# Patient Record
Sex: FEMALE | Race: WHITE | NOT HISPANIC OR LATINO | Employment: FULL TIME | URBAN - METROPOLITAN AREA
[De-identification: names, ages, dates, MRNs, and addresses within clinical notes are randomized per-mention and may not be internally consistent; named-entity substitution may affect disease eponyms.]

---

## 2023-04-11 DIAGNOSIS — F10.20 ALCOHOL USE DISORDER, SEVERE, DEPENDENCE (MULTI): Primary | ICD-10-CM

## 2023-04-13 RX ORDER — NALTREXONE HYDROCHLORIDE 50 MG/1
50 TABLET, FILM COATED ORAL DAILY
COMMUNITY
Start: 2023-01-12 | End: 2023-04-13 | Stop reason: SDUPTHER

## 2023-04-14 DIAGNOSIS — R21 RASH: ICD-10-CM

## 2023-04-14 DIAGNOSIS — Z00.00 ROUTINE GENERAL MEDICAL EXAMINATION AT A HEALTH CARE FACILITY: Primary | ICD-10-CM

## 2023-04-14 RX ORDER — NALTREXONE HYDROCHLORIDE 50 MG/1
50 TABLET, FILM COATED ORAL DAILY
Qty: 30 TABLET | Refills: 0 | Status: SHIPPED | OUTPATIENT
Start: 2023-04-14 | End: 2023-05-23 | Stop reason: SDUPTHER

## 2023-04-14 RX ORDER — MUPIROCIN 20 MG/G
OINTMENT TOPICAL 3 TIMES DAILY
Qty: 15 G | Refills: 0 | Status: SHIPPED | OUTPATIENT
Start: 2023-04-14 | End: 2023-04-24

## 2023-04-14 NOTE — TELEPHONE ENCOUNTER
Patient notified, states she was taking the medicine previous to seeing you but starting taking it again in the fall. She will schedule an appt to discuss this as well. Thanks

## 2023-04-25 ENCOUNTER — APPOINTMENT (OUTPATIENT)
Dept: PRIMARY CARE | Facility: CLINIC | Age: 43
End: 2023-04-25
Payer: COMMERCIAL

## 2023-05-23 DIAGNOSIS — F10.20 ALCOHOL USE DISORDER, SEVERE, DEPENDENCE (MULTI): Primary | ICD-10-CM

## 2023-05-24 RX ORDER — NALTREXONE HYDROCHLORIDE 50 MG/1
50 TABLET, FILM COATED ORAL DAILY
Qty: 30 TABLET | Refills: 0 | Status: SHIPPED | OUTPATIENT
Start: 2023-05-24 | End: 2023-09-13

## 2023-05-25 ENCOUNTER — APPOINTMENT (OUTPATIENT)
Dept: PRIMARY CARE | Facility: CLINIC | Age: 43
End: 2023-05-25
Payer: COMMERCIAL

## 2023-06-12 ASSESSMENT — PROMIS GLOBAL HEALTH SCALE
RATE_PHYSICAL_HEALTH: GOOD
EMOTIONAL_PROBLEMS: SOMETIMES
CARRYOUT_SOCIAL_ACTIVITIES: VERY GOOD
RATE_AVERAGE_FATIGUE: MILD
RATE_MENTAL_HEALTH: GOOD
CARRYOUT_PHYSICAL_ACTIVITIES: COMPLETELY
RATE_SOCIAL_SATISFACTION: FAIR
RATE_GENERAL_HEALTH: GOOD
RATE_QUALITY_OF_LIFE: GOOD
RATE_AVERAGE_PAIN: 1

## 2023-06-16 ENCOUNTER — APPOINTMENT (OUTPATIENT)
Dept: PRIMARY CARE | Facility: CLINIC | Age: 43
End: 2023-06-16
Payer: COMMERCIAL

## 2023-06-20 ENCOUNTER — APPOINTMENT (OUTPATIENT)
Dept: PRIMARY CARE | Facility: CLINIC | Age: 43
End: 2023-06-20
Payer: COMMERCIAL

## 2023-07-21 DIAGNOSIS — K21.9 GASTROESOPHAGEAL REFLUX DISEASE WITHOUT ESOPHAGITIS: Primary | ICD-10-CM

## 2023-07-21 RX ORDER — LANSOPRAZOLE 30 MG/1
30 CAPSULE, DELAYED RELEASE ORAL
COMMUNITY
End: 2023-07-21 | Stop reason: SDUPTHER

## 2023-07-23 RX ORDER — LANSOPRAZOLE 30 MG/1
30 CAPSULE, DELAYED RELEASE ORAL
Qty: 90 CAPSULE | Refills: 0 | Status: SHIPPED | OUTPATIENT
Start: 2023-07-23 | End: 2023-10-12 | Stop reason: SDUPTHER

## 2023-08-24 ENCOUNTER — APPOINTMENT (OUTPATIENT)
Dept: PRIMARY CARE | Facility: CLINIC | Age: 43
End: 2023-08-24
Payer: COMMERCIAL

## 2023-09-09 ASSESSMENT — PROMIS GLOBAL HEALTH SCALE
RATE_MENTAL_HEALTH: VERY GOOD
RATE_SOCIAL_SATISFACTION: VERY GOOD
CARRYOUT_PHYSICAL_ACTIVITIES: COMPLETELY
RATE_AVERAGE_PAIN: 1
RATE_QUALITY_OF_LIFE: VERY GOOD
CARRYOUT_SOCIAL_ACTIVITIES: VERY GOOD
RATE_GENERAL_HEALTH: VERY GOOD
RATE_AVERAGE_FATIGUE: MILD
RATE_PHYSICAL_HEALTH: VERY GOOD
EMOTIONAL_PROBLEMS: RARELY

## 2023-09-13 ENCOUNTER — OFFICE VISIT (OUTPATIENT)
Dept: PRIMARY CARE | Facility: CLINIC | Age: 43
End: 2023-09-13
Payer: COMMERCIAL

## 2023-09-13 VITALS
WEIGHT: 131.2 LBS | SYSTOLIC BLOOD PRESSURE: 120 MMHG | HEIGHT: 66 IN | BODY MASS INDEX: 21.08 KG/M2 | DIASTOLIC BLOOD PRESSURE: 85 MMHG | HEART RATE: 83 BPM

## 2023-09-13 DIAGNOSIS — G47.9 SLEEP DISORDER: ICD-10-CM

## 2023-09-13 DIAGNOSIS — Z00.00 ROUTINE GENERAL MEDICAL EXAMINATION AT A HEALTH CARE FACILITY: Primary | ICD-10-CM

## 2023-09-13 DIAGNOSIS — Z23 NEED FOR INFLUENZA VACCINATION: ICD-10-CM

## 2023-09-13 DIAGNOSIS — F41.8 DEPRESSION WITH ANXIETY: ICD-10-CM

## 2023-09-13 DIAGNOSIS — K21.9 GASTROESOPHAGEAL REFLUX DISEASE, UNSPECIFIED WHETHER ESOPHAGITIS PRESENT: ICD-10-CM

## 2023-09-13 DIAGNOSIS — E78.5 HYPERLIPIDEMIA, UNSPECIFIED HYPERLIPIDEMIA TYPE: ICD-10-CM

## 2023-09-13 PROBLEM — L73.2 HIDRADENITIS SUPPURATIVA: Status: RESOLVED | Noted: 2023-08-10 | Resolved: 2023-09-13

## 2023-09-13 PROBLEM — I10 WHITE COAT SYNDROME WITH HYPERTENSION: Status: RESOLVED | Noted: 2023-09-13 | Resolved: 2023-09-13

## 2023-09-13 PROBLEM — F41.9 ANXIETY: Status: ACTIVE | Noted: 2023-09-13

## 2023-09-13 PROBLEM — F10.20 ALCOHOL USE DISORDER, SEVERE, DEPENDENCE (MULTI): Status: RESOLVED | Noted: 2023-09-13 | Resolved: 2023-09-13

## 2023-09-13 PROBLEM — L72.3 SEBACEOUS CYST: Status: RESOLVED | Noted: 2023-08-10 | Resolved: 2023-09-13

## 2023-09-13 PROBLEM — N60.99 ATYPICAL DUCTAL HYPERPLASIA OF BREAST: Status: ACTIVE | Noted: 2023-09-13

## 2023-09-13 PROBLEM — N97.8 AGE RELATED FEMALE INFERTILITY: Status: ACTIVE | Noted: 2023-09-13

## 2023-09-13 PROBLEM — O00.109 TUBAL ECTOPIC PREGNANCY (HHS-HCC): Status: RESOLVED | Noted: 2023-09-13 | Resolved: 2023-09-13

## 2023-09-13 PROBLEM — J30.9 ALLERGIC RHINITIS: Status: ACTIVE | Noted: 2023-09-13

## 2023-09-13 PROBLEM — M26.69 TMJ INFLAMMATION: Status: RESOLVED | Noted: 2023-09-13 | Resolved: 2023-09-13

## 2023-09-13 LAB
BASOPHILS (10*3/UL) IN BLOOD BY AUTOMATED COUNT: 0.05 X10E9/L (ref 0–0.1)
BASOPHILS/100 LEUKOCYTES IN BLOOD BY AUTOMATED COUNT: 0.5 % (ref 0–2)
CALCIDIOL (25 OH VITAMIN D3) (NG/ML) IN SER/PLAS: 62 NG/ML
COBALAMIN (VITAMIN B12) (PG/ML) IN SER/PLAS: 409 PG/ML (ref 211–911)
EOSINOPHILS (10*3/UL) IN BLOOD BY AUTOMATED COUNT: 0.23 X10E9/L (ref 0–0.7)
EOSINOPHILS/100 LEUKOCYTES IN BLOOD BY AUTOMATED COUNT: 2.5 % (ref 0–6)
ERYTHROCYTE DISTRIBUTION WIDTH (RATIO) BY AUTOMATED COUNT: 12.9 % (ref 11.5–14.5)
ERYTHROCYTE MEAN CORPUSCULAR HEMOGLOBIN CONCENTRATION (G/DL) BY AUTOMATED: 32.3 G/DL (ref 32–36)
ERYTHROCYTE MEAN CORPUSCULAR VOLUME (FL) BY AUTOMATED COUNT: 98 FL (ref 80–100)
ERYTHROCYTES (10*6/UL) IN BLOOD BY AUTOMATED COUNT: 4.51 X10E12/L (ref 4–5.2)
ESTIMATED AVERAGE GLUCOSE FOR HBA1C: 100 MG/DL
FOLATE (NG/ML) IN SER/PLAS: >24 NG/ML
HEMATOCRIT (%) IN BLOOD BY AUTOMATED COUNT: 44.3 % (ref 36–46)
HEMOGLOBIN (G/DL) IN BLOOD: 14.3 G/DL (ref 12–16)
HEMOGLOBIN A1C/HEMOGLOBIN TOTAL IN BLOOD: 5.1 %
IMMATURE GRANULOCYTES/100 LEUKOCYTES IN BLOOD BY AUTOMATED COUNT: 0.2 % (ref 0–0.9)
LEUKOCYTES (10*3/UL) IN BLOOD BY AUTOMATED COUNT: 9.1 X10E9/L (ref 4.4–11.3)
LYMPHOCYTES (10*3/UL) IN BLOOD BY AUTOMATED COUNT: 2.13 X10E9/L (ref 1.2–4.8)
LYMPHOCYTES/100 LEUKOCYTES IN BLOOD BY AUTOMATED COUNT: 23.4 % (ref 13–44)
MONOCYTES (10*3/UL) IN BLOOD BY AUTOMATED COUNT: 0.51 X10E9/L (ref 0.1–1)
MONOCYTES/100 LEUKOCYTES IN BLOOD BY AUTOMATED COUNT: 5.6 % (ref 2–10)
NEUTROPHILS (10*3/UL) IN BLOOD BY AUTOMATED COUNT: 6.16 X10E9/L (ref 1.2–7.7)
NEUTROPHILS/100 LEUKOCYTES IN BLOOD BY AUTOMATED COUNT: 67.8 % (ref 40–80)
NRBC (PER 100 WBCS) BY AUTOMATED COUNT: 0 /100 WBC (ref 0–0)
PLATELETS (10*3/UL) IN BLOOD AUTOMATED COUNT: 219 X10E9/L (ref 150–450)
THYROTROPIN (MIU/L) IN SER/PLAS BY DETECTION LIMIT <= 0.05 MIU/L: 2.44 MIU/L (ref 0.44–3.98)

## 2023-09-13 PROCEDURE — 85025 COMPLETE CBC W/AUTO DIFF WBC: CPT

## 2023-09-13 PROCEDURE — 80061 LIPID PANEL: CPT

## 2023-09-13 PROCEDURE — 80053 COMPREHEN METABOLIC PANEL: CPT

## 2023-09-13 PROCEDURE — 82746 ASSAY OF FOLIC ACID SERUM: CPT

## 2023-09-13 PROCEDURE — 99396 PREV VISIT EST AGE 40-64: CPT | Performed by: INTERNAL MEDICINE

## 2023-09-13 PROCEDURE — 93000 ELECTROCARDIOGRAM COMPLETE: CPT | Performed by: INTERNAL MEDICINE

## 2023-09-13 PROCEDURE — 83550 IRON BINDING TEST: CPT

## 2023-09-13 PROCEDURE — 83540 ASSAY OF IRON: CPT

## 2023-09-13 PROCEDURE — 90471 IMMUNIZATION ADMIN: CPT | Performed by: INTERNAL MEDICINE

## 2023-09-13 PROCEDURE — 84443 ASSAY THYROID STIM HORMONE: CPT

## 2023-09-13 PROCEDURE — 82607 VITAMIN B-12: CPT

## 2023-09-13 PROCEDURE — 82306 VITAMIN D 25 HYDROXY: CPT

## 2023-09-13 PROCEDURE — 83036 HEMOGLOBIN GLYCOSYLATED A1C: CPT

## 2023-09-13 PROCEDURE — 90686 IIV4 VACC NO PRSV 0.5 ML IM: CPT | Performed by: INTERNAL MEDICINE

## 2023-09-13 RX ORDER — MONTELUKAST SODIUM 10 MG/1
1 TABLET ORAL NIGHTLY
COMMUNITY
Start: 2023-06-29

## 2023-09-13 RX ORDER — EPINEPHRINE 0.3 MG/.3ML
0.3 INJECTION SUBCUTANEOUS
COMMUNITY
Start: 2014-04-11

## 2023-09-13 RX ORDER — CLONIDINE HYDROCHLORIDE 0.1 MG/1
TABLET ORAL 2 TIMES DAILY
COMMUNITY
Start: 2021-01-27 | End: 2023-10-15 | Stop reason: SDUPTHER

## 2023-09-13 RX ORDER — ALBUTEROL SULFATE 90 UG/1
2 AEROSOL, METERED RESPIRATORY (INHALATION) EVERY 4 HOURS PRN
COMMUNITY
Start: 2017-12-20

## 2023-09-13 RX ORDER — AZELASTINE 1 MG/ML
1 SPRAY, METERED NASAL 2 TIMES DAILY
COMMUNITY
Start: 2022-04-25

## 2023-09-13 RX ORDER — LEVOCETIRIZINE DIHYDROCHLORIDE 5 MG/1
5 TABLET, FILM COATED ORAL
COMMUNITY
Start: 2022-05-18

## 2023-09-13 RX ORDER — FLUNISOLIDE 0.25 MG/ML
SOLUTION NASAL
COMMUNITY
Start: 2022-05-17

## 2023-09-13 ASSESSMENT — ENCOUNTER SYMPTOMS
FEVER: 0
DYSPHORIC MOOD: 0
COLOR CHANGE: 0
DECREASED CONCENTRATION: 0
ACTIVITY CHANGE: 0
SLEEP DISTURBANCE: 0
SINUS PAIN: 0
NAUSEA: 0
VOMITING: 0
WHEEZING: 0
HYPERACTIVE: 0
ABDOMINAL DISTENTION: 0
BRUISES/BLEEDS EASILY: 0
NECK STIFFNESS: 0
EYE DISCHARGE: 0
PALPITATIONS: 0
FACIAL ASYMMETRY: 0
HEMATURIA: 0
NECK PAIN: 0
NUMBNESS: 0
NERVOUS/ANXIOUS: 0
CHEST TIGHTNESS: 0
JOINT SWELLING: 0
MYALGIAS: 0
ADENOPATHY: 0
SHORTNESS OF BREATH: 0
COUGH: 0
FREQUENCY: 0
UNEXPECTED WEIGHT CHANGE: 0
RHINORRHEA: 0
LIGHT-HEADEDNESS: 0
WEAKNESS: 0
CONSTIPATION: 0
ARTHRALGIAS: 0
FATIGUE: 0
SORE THROAT: 0
DYSURIA: 0
SINUS PRESSURE: 0
DIARRHEA: 0
EYE ITCHING: 0
ABDOMINAL PAIN: 0
VOICE CHANGE: 0
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
## 2023-09-13 NOTE — PATIENT INSTRUCTIONS
We will follow up on all comprehensive blood work once results are available and make any recommendations based on these results as may be indicated.  Please continue with routine gynecologic exams and annual mammograms.  Colon cancer screening will start next year at age 45.  Thank you for receiving your flu shot.  If you have any questions or concerns, please feel free to contact us.  Otherwise, we are happy to see you back annually for your wellness visits.

## 2023-09-14 LAB
ALANINE AMINOTRANSFERASE (SGPT) (U/L) IN SER/PLAS: 15 U/L (ref 7–45)
ALBUMIN (G/DL) IN SER/PLAS: 4.5 G/DL (ref 3.4–5)
ALKALINE PHOSPHATASE (U/L) IN SER/PLAS: 42 U/L (ref 33–110)
ANION GAP IN SER/PLAS: 14 MMOL/L (ref 10–20)
ASPARTATE AMINOTRANSFERASE (SGOT) (U/L) IN SER/PLAS: 17 U/L (ref 9–39)
BILIRUBIN TOTAL (MG/DL) IN SER/PLAS: 0.8 MG/DL (ref 0–1.2)
CALCIUM (MG/DL) IN SER/PLAS: 10.5 MG/DL (ref 8.6–10.6)
CARBON DIOXIDE, TOTAL (MMOL/L) IN SER/PLAS: 27 MMOL/L (ref 21–32)
CHLORIDE (MMOL/L) IN SER/PLAS: 100 MMOL/L (ref 98–107)
CHOLESTEROL (MG/DL) IN SER/PLAS: 215 MG/DL (ref 0–199)
CHOLESTEROL IN HDL (MG/DL) IN SER/PLAS: 68.9 MG/DL
CHOLESTEROL/HDL RATIO: 3.1
CREATININE (MG/DL) IN SER/PLAS: 0.62 MG/DL (ref 0.5–1.05)
GFR FEMALE: >90 ML/MIN/1.73M2
GLUCOSE (MG/DL) IN SER/PLAS: 79 MG/DL (ref 74–99)
IRON (UG/DL) IN SER/PLAS: 175 UG/DL (ref 35–150)
IRON BINDING CAPACITY (UG/DL) IN SER/PLAS: 459 UG/DL (ref 240–445)
IRON SATURATION (%) IN SER/PLAS: 38 % (ref 25–45)
LDL: 106 MG/DL (ref 0–99)
NON HDL CHOLESTEROL: 146 MG/DL
POTASSIUM (MMOL/L) IN SER/PLAS: 4.5 MMOL/L (ref 3.5–5.3)
PROTEIN TOTAL: 7.1 G/DL (ref 6.4–8.2)
SODIUM (MMOL/L) IN SER/PLAS: 136 MMOL/L (ref 136–145)
TRIGLYCERIDE (MG/DL) IN SER/PLAS: 203 MG/DL (ref 0–149)
UREA NITROGEN (MG/DL) IN SER/PLAS: 7 MG/DL (ref 6–23)
VLDL: 41 MG/DL (ref 0–40)

## 2023-09-19 ENCOUNTER — TELEPHONE (OUTPATIENT)
Dept: PRIMARY CARE | Facility: CLINIC | Age: 43
End: 2023-09-19

## 2023-09-19 LAB
CHLAMYDIA TRACH., AMPLIFIED: NEGATIVE
N. GONORRHEA, AMPLIFIED: NEGATIVE
TRICHOMONAS VAGINALIS: NEGATIVE

## 2023-10-12 DIAGNOSIS — K21.9 GASTROESOPHAGEAL REFLUX DISEASE WITHOUT ESOPHAGITIS: Primary | ICD-10-CM

## 2023-10-13 RX ORDER — LANSOPRAZOLE 30 MG/1
30 CAPSULE, DELAYED RELEASE ORAL
Qty: 90 CAPSULE | Refills: 1 | Status: SHIPPED | OUTPATIENT
Start: 2023-10-13 | End: 2024-05-19 | Stop reason: SDUPTHER

## 2023-10-15 DIAGNOSIS — F41.9 ANXIETY: Primary | ICD-10-CM

## 2023-10-15 RX ORDER — CLONIDINE HYDROCHLORIDE 0.1 MG/1
0.1 TABLET ORAL 2 TIMES DAILY
Qty: 180 TABLET | Refills: 1 | Status: SHIPPED | OUTPATIENT
Start: 2023-10-15 | End: 2024-04-13 | Stop reason: SDUPTHER

## 2023-11-08 ENCOUNTER — TELEPHONE (OUTPATIENT)
Dept: OBSTETRICS AND GYNECOLOGY | Facility: CLINIC | Age: 43
End: 2023-11-08

## 2023-11-08 NOTE — TELEPHONE ENCOUNTER
Patient calling in regard to insurance, one of her test wasn't covered and would like for it to be re billed.

## 2023-11-20 DIAGNOSIS — R06.83 SNORING: ICD-10-CM

## 2023-11-20 DIAGNOSIS — J30.9 ALLERGIC RHINITIS, UNSPECIFIED SEASONALITY, UNSPECIFIED TRIGGER: Primary | ICD-10-CM

## 2023-12-13 ENCOUNTER — APPOINTMENT (OUTPATIENT)
Dept: OTOLARYNGOLOGY | Facility: CLINIC | Age: 43
End: 2023-12-13
Payer: COMMERCIAL

## 2024-01-19 ENCOUNTER — PATIENT MESSAGE (OUTPATIENT)
Dept: DERMATOLOGY | Facility: CLINIC | Age: 44
End: 2024-01-19
Payer: COMMERCIAL

## 2024-01-19 DIAGNOSIS — L30.9 HAND DERMATITIS: Primary | ICD-10-CM

## 2024-01-22 RX ORDER — TRIAMCINOLONE ACETONIDE 1 MG/G
CREAM TOPICAL 2 TIMES DAILY
Qty: 80 G | Refills: 3 | Status: SHIPPED | OUTPATIENT
Start: 2024-01-22 | End: 2024-01-31 | Stop reason: SDUPTHER

## 2024-01-22 RX ORDER — TRIAMCINOLONE ACETONIDE 1 MG/G
CREAM TOPICAL 2 TIMES DAILY
Qty: 80 G | Refills: 3 | Status: SHIPPED | OUTPATIENT
Start: 2024-01-22 | End: 2024-01-22 | Stop reason: SDUPTHER

## 2024-01-31 RX ORDER — TRIAMCINOLONE ACETONIDE 1 MG/G
CREAM TOPICAL 2 TIMES DAILY
Qty: 90 G | Refills: 3 | Status: SHIPPED | OUTPATIENT
Start: 2024-01-31

## 2024-02-14 ENCOUNTER — HOSPITAL ENCOUNTER (OUTPATIENT)
Dept: RADIOLOGY | Facility: EXTERNAL LOCATION | Age: 44
Discharge: HOME | End: 2024-02-14

## 2024-02-14 DIAGNOSIS — M25.562 ACUTE BILATERAL KNEE PAIN: ICD-10-CM

## 2024-02-14 DIAGNOSIS — M25.561 ACUTE BILATERAL KNEE PAIN: ICD-10-CM

## 2024-04-13 DIAGNOSIS — F41.9 ANXIETY: ICD-10-CM

## 2024-04-13 RX ORDER — CLONIDINE HYDROCHLORIDE 0.1 MG/1
0.1 TABLET ORAL 2 TIMES DAILY
Qty: 180 TABLET | Refills: 1 | Status: SHIPPED | OUTPATIENT
Start: 2024-04-13

## 2024-05-15 ENCOUNTER — LAB REQUISITION (OUTPATIENT)
Dept: LAB | Facility: HOSPITAL | Age: 44
End: 2024-05-15
Payer: COMMERCIAL

## 2024-05-15 DIAGNOSIS — R35.0 FREQUENCY OF MICTURITION: ICD-10-CM

## 2024-05-15 PROCEDURE — 87086 URINE CULTURE/COLONY COUNT: CPT

## 2024-05-17 LAB — BACTERIA UR CULT: ABNORMAL

## 2024-05-19 DIAGNOSIS — K21.9 GASTROESOPHAGEAL REFLUX DISEASE WITHOUT ESOPHAGITIS: ICD-10-CM

## 2024-05-19 RX ORDER — LANSOPRAZOLE 30 MG/1
30 CAPSULE, DELAYED RELEASE ORAL
Qty: 90 CAPSULE | Refills: 1 | Status: SHIPPED | OUTPATIENT
Start: 2024-05-19

## 2024-05-21 ENCOUNTER — PATIENT MESSAGE (OUTPATIENT)
Dept: DERMATOLOGY | Facility: CLINIC | Age: 44
End: 2024-05-21
Payer: COMMERCIAL

## 2024-06-21 ENCOUNTER — APPOINTMENT (OUTPATIENT)
Dept: RADIOLOGY | Facility: HOSPITAL | Age: 44
End: 2024-06-21
Payer: COMMERCIAL

## 2024-06-21 ENCOUNTER — HOSPITAL ENCOUNTER (OUTPATIENT)
Dept: RADIOLOGY | Facility: HOSPITAL | Age: 44
Discharge: HOME | End: 2024-06-21
Payer: COMMERCIAL

## 2024-06-21 ENCOUNTER — PATIENT MESSAGE (OUTPATIENT)
Dept: SURGICAL ONCOLOGY | Facility: CLINIC | Age: 44
End: 2024-06-21
Payer: COMMERCIAL

## 2024-06-21 DIAGNOSIS — Z12.39 ENCOUNTER FOR OTHER SCREENING FOR MALIGNANT NEOPLASM OF BREAST: ICD-10-CM

## 2024-06-21 NOTE — TELEPHONE ENCOUNTER
From: Ofelia Wiggins  To: Ofelia Chavarria  Sent: 6/21/2024 2:19 PM EDT  Subject: MRI    Hi  Where are the locations to schedule the breast MRI?  Thanks

## 2024-06-25 ENCOUNTER — APPOINTMENT (OUTPATIENT)
Dept: ORTHOPEDIC SURGERY | Facility: CLINIC | Age: 44
End: 2024-06-25
Payer: COMMERCIAL

## 2024-06-25 ENCOUNTER — HOSPITAL ENCOUNTER (OUTPATIENT)
Dept: RADIOLOGY | Facility: CLINIC | Age: 44
Discharge: HOME | End: 2024-06-25
Payer: COMMERCIAL

## 2024-06-25 DIAGNOSIS — M22.2X1 PATELLOFEMORAL ARTHRALGIA OF BOTH KNEES: Primary | ICD-10-CM

## 2024-06-25 DIAGNOSIS — M25.562 ACUTE BILATERAL KNEE PAIN: ICD-10-CM

## 2024-06-25 DIAGNOSIS — M25.561 ACUTE BILATERAL KNEE PAIN: ICD-10-CM

## 2024-06-25 DIAGNOSIS — M22.2X2 PATELLOFEMORAL ARTHRALGIA OF BOTH KNEES: Primary | ICD-10-CM

## 2024-06-25 PROCEDURE — 73562 X-RAY EXAM OF KNEE 3: CPT | Mod: RT

## 2024-06-25 PROCEDURE — 99204 OFFICE O/P NEW MOD 45 MIN: CPT | Performed by: STUDENT IN AN ORGANIZED HEALTH CARE EDUCATION/TRAINING PROGRAM

## 2024-06-25 PROCEDURE — 73560 X-RAY EXAM OF KNEE 1 OR 2: CPT | Mod: LT

## 2024-06-25 RX ORDER — DICLOFENAC SODIUM 100 MG/1
100 TABLET, EXTENDED RELEASE ORAL DAILY
Qty: 30 TABLET | Refills: 0 | Status: SHIPPED | OUTPATIENT
Start: 2024-06-25 | End: 2024-07-25

## 2024-06-25 ASSESSMENT — PAIN DESCRIPTION - DESCRIPTORS: DESCRIPTORS: ACHING;THROBBING;SHARP

## 2024-06-25 ASSESSMENT — PAIN SCALES - GENERAL: PAINLEVEL_OUTOF10: 4

## 2024-06-25 ASSESSMENT — PAIN - FUNCTIONAL ASSESSMENT: PAIN_FUNCTIONAL_ASSESSMENT: 0-10

## 2024-06-26 DIAGNOSIS — M25.561 ACUTE BILATERAL KNEE PAIN: Primary | ICD-10-CM

## 2024-06-26 DIAGNOSIS — M25.562 ACUTE BILATERAL KNEE PAIN: Primary | ICD-10-CM

## 2024-06-26 RX ORDER — DICLOFENAC SODIUM 10 MG/G
4 GEL TOPICAL 4 TIMES DAILY
Qty: 100 G | Refills: 1 | Status: SHIPPED | OUTPATIENT
Start: 2024-06-26

## 2024-06-26 NOTE — PROGRESS NOTES
Ofelia Wiggins  is a 43 y.o. year-old  female. she  is a new patient to our office and presents with a chief complaint of Bilateral  knee pain.  She reports she injured her knees at work when she slipped on the floor landing on both of her knees.  This was back in the winter.  She rested iced but has had persistent pain.  She has difficulty kneeling sitting for long periods of time and going up and down stairs.  Her pain is primarily anterior.  Has not noticed any appreciable effusions.      Past Medical, Family, and Social History reviewed   Review of Systems  A complete review of systems was conducted, pertinent only to the HPI noted above.  Constitutional: None  Eyes: No additions to above history  Ears, Nose, Throat: No additions to above history  Cardiovascular: No additions to above history  Respiratory: No additions to above history  GI: No additions to above history  : No additions to above history  Skin/Neuro: No additions to above history  Endocrine/Heme/Lymph: No additions to above history  Immunologic: No additions to above history  Psychiatric: No additions to above history  Musculoskeletal: see above    GEN: Alert and Oriented x 3  Constitutional: Well appearing , in no apparent distress.  Skin: No rashes, erythema, or induration around knee    MUSCULOSKELETAL EXAM:     Bilateral KNEE:  ROM: 3/0/140  Effusion: negative  Alignment: [neutral]      Gait: [normal]  Sensation intact bilaterally sural/saphenous/sp/dp/tibial nerve bilaterally  Motor 5/5 knee flexion/extension/foot DF/PF/EHL/FHL bilaterally  Palpable/symmetric DP and PT pulse bilaterally      PATELLAR/EXTENSOR MECHANISM:   KNEE:  Patellar Crepitus: n  Patellar Compression Pain:yes  Patellar Apprehension: [no]  Extensor Mechanism: [intact]  Straight Leg Raise: good  Tender to palpation over her anterior knee no bursal fluid collection    LIGAMENTS:  ACL: Lachmans: [negative]  PCL: [stable]  Valgus at 0 degrees: [stable]  Valgus at 30  degrees: [stable]  Varus at 0 degrees: [stable]  Varus at 30 degrees: [stable]    MENISCUS EXAM:  Joint Line Tenderness:neg  McMurrays: [negative]  Pain with Deep Flexion: [No]    Xrays independently viewed and interpreted: No evidence of fracture or degenerative changes    The patient history, physical examination and imaging studies are consistent with patellofemoral pain syndrome exacerbated by her fall directly onto her knees likely bone bruise to bilateral. The treatment options including medical management, PT and NSAIDs were discussed at length.  We discussed the various reasons and anatomic variations associated with PF pain.  I have referred the patient to physical therapy with a specific prescription focused on quad/hamstring/hip and core strengthening as well as flexibility.     I explained to the patient, these symptoms can be protracted and can take months to improve. Nevertheless, if the symptoms do not progressively improve, I have recommended the patient follow-up with me at which point we will repeat the evaluation and modify the treatment plan.  I have prescribed a prescription for Voltaren gel    The patient acknowledged that they understand this plan and will follow up accordingly.    All of the patient's questions and concerns were addressed today, but they know they can contact our office at any time with any further concerns.

## 2024-07-01 ENCOUNTER — EVALUATION (OUTPATIENT)
Dept: PHYSICAL THERAPY | Facility: CLINIC | Age: 44
End: 2024-07-01
Payer: COMMERCIAL

## 2024-07-01 DIAGNOSIS — M25.561 ACUTE BILATERAL KNEE PAIN: ICD-10-CM

## 2024-07-01 DIAGNOSIS — M25.562 ACUTE BILATERAL KNEE PAIN: ICD-10-CM

## 2024-07-01 PROCEDURE — 97162 PT EVAL MOD COMPLEX 30 MIN: CPT | Mod: GP

## 2024-07-01 NOTE — PROGRESS NOTES
Physical Therapy    Physical Therapy Evaluation and Treatment    Patient Name: Ofelia Wiggins  MRN: [id number]  Today's Date: 7/1/2024  Time Calculation  Start Time: 1315  Stop Time: 1345  Time Calculation (min): 30 min    Insurance:  Visit number: 1 of 25  Authorization info: No auth  Insurance Type: Payor: [employer] [facility] / Plan: [employer] / Product Type: *No Product type* /     Current Problem  1. 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  She landed on both knees, unable to break fall with her hands because she was carrying stuff  Went to Urgent Care and returned to work on 2/15/24  Explains she had to negotiate a lot of stairs after returning to work  Her knees felt tender, but she was able to do everything expected of her  However, knee pain became exacerbated over the next few months  Aggravated after switching shoes and traveling at the airport  Pain so severe at the airport she had to leave in a wheelchair because she could no longer walk  Prolonged walking seems to always exacerbate symptoms  Kneeling and squatting also trigger pain  Pain is located over anterior knees   Consulted with ortho   Xrays normal  Prescribed Voltaren gel and PT  Encouraged to ice and elevate  Symptoms are slowly improving  [place], boyfriend also lives in a ranch  She is always moving, lives an active lifestyle but doesn't enjoy formal exercising  Previously studied fashion, now employed as speech therapist at [employer]      Imaging:   Xrays Right, Left Knee  06/2024  IMPRESSION: Normal radiographs of the knees     Pain:   Current: 3/10     Red flags: None    Patient/Family Goal: Eliminate pain, be able to walk all distances without pain    OBJECTIVE:    Palpation: Tender to light palpation over R/L patellar tendon and medial joint line    Hip AROM: > WNL all planes R/L  HEIDI: Negative  FADIR: Negative    Knee AROM: WFL R/L, pain end range flex and ext    Knee Strength:  Flex: Weak and painfree  Ext: Weak and painful  SLR: no lag, but endorses weakness     HEP:  Access Code: [email or web address]  URL: [email or web address]  Date: 07/01/2024  Prepared by: Sonali Stallworth    Exercises  - Bilateral Short Arc Quad Set  - 1-2 x daily - 7 x weekly - 2-3 sets - 10 reps - 5-10 hold  - Active Straight Leg Raise with Quad Set  - 1-2 x daily - 7 x weekly - 2-3 sets - 10 reps - 2-3 hold  - Supine Bridge  - 1-2 x daily - 7 x weekly - 2-3 sets - 10 reps - 2-3 hold  - Modified Goyo Stretch  - 1-2 x daily - 7 x weekly - 1 sets - 3-5 reps - 30 hold      ASSESSMENT:   Persistent bilateral anterior knee pain after accidental fall at work on 2/13/24. X-rays were normal. No concern for internal derangement at this time. Bilateral patellar tendons appear to be inflamed, tender and weak. Her knees have not really gotten a proper rest break since the fall on 2/13/24. Treatment should focus on activity modification, patellar tendon loading, LE strengthening, LE flexibility and gait training. I suggested dry needling as an intervention, however, pt declined. Recommend supplementing exercise with manual therapy and kinesiotaping.      PLAN:  OP PT PLAN:  Treatment/Interventions: Blood Flow Restriction Therapy  , Gait training , Manual Therapy  , Neuromuscular re-education , Taping techniques , and Therapeutic exercise    PT Plan: Skilled PT   PT Frequency: 1 time per week   Duration:4-6 weeks  Visits Approved: 25 visits  Rehab Potential: Good  Plan of Care Agreement: Patient         Goals:   Pt will  report pain as 0/10 in both knees at rest to indicate healing and rest  Pt will demonstrate strong and painfree resisted knee ext to demonstrate improved strength of the patellar tendon  Pt will report she is able to walk 60+ minutes without onset of bilateral knee pain to resume prior level of function  Pt will demonstrate compliance with HEP to be discharged to HEP

## 2024-07-02 ENCOUNTER — HOSPITAL ENCOUNTER (OUTPATIENT)
Dept: RADIOLOGY | Facility: HOSPITAL | Age: 44
Discharge: HOME | End: 2024-07-02

## 2024-07-02 PROCEDURE — 6100000003 BI MR BREAST BILATERAL WITH CONTRAST FAST SCREENING SELF PAY

## 2024-07-02 PROCEDURE — 2500000004 HC RX 250 GENERAL PHARMACY W/ HCPCS (ALT 636 FOR OP/ED): Performed by: NURSE PRACTITIONER

## 2024-07-02 PROCEDURE — A9575 INJ GADOTERATE MEGLUMI 0.1ML: HCPCS | Performed by: NURSE PRACTITIONER

## 2024-07-02 RX ORDER — GADOTERATE MEGLUMINE 376.9 MG/ML
0.2 INJECTION INTRAVENOUS
Status: COMPLETED | OUTPATIENT
Start: 2024-07-02 | End: 2024-07-02

## 2024-07-22 ENCOUNTER — APPOINTMENT (OUTPATIENT)
Dept: PHYSICAL THERAPY | Facility: CLINIC | Age: 44
End: 2024-07-22
Payer: COMMERCIAL

## 2024-07-22 DIAGNOSIS — M25.561 ACUTE BILATERAL KNEE PAIN: Primary | ICD-10-CM

## 2024-07-22 DIAGNOSIS — M25.562 ACUTE BILATERAL KNEE PAIN: Primary | ICD-10-CM

## 2024-07-22 NOTE — PROGRESS NOTES
Physical Therapy    Physical Therapy Treatment    Patient Name: Ofelia Wiggins  MRN: [id number]  Today's Date: 7/22/2024       Insurance:  Visit number: 2 of 25  Authorization info: No auth  Insurance Type: Payor: [employer] / Plan: [employer] / Product Type: *No Product type* /     Current Problem  No diagnosis found.      General  Persistent bilateral anterior knee pain after accidental fall at work on 2/13/24  This is supposed to be a [facility] case; awaiting C9     Precautions  Left hip pain    SUBJECTIVE:   ***      Imaging:   Xrays Right, Left Knee 06/2024  IMPRESSION: Normal radiographs of the knees     Pain:   Current: ***    Red flags: None    Patient/Family Goal: Eliminate pain, be able to walk all distances without pain    OBJECTIVE:    Palpation: Tender to light palpation over R/L patellar tendon and medial joint line    Hip AROM: > WNL all planes R/L  HEIDI: Negative  FADIR: Negative    Knee AROM: WFL R/L, pain end range flex and ext    Knee Strength:  Flex: Weak and painfree  Ext: Weak and painful  SLR: no lag, but endorses weakness     HEP:  Access Code: [email or web address]  URL: [email or web address]  Date: 07/01/2024  Prepared by: Sonali Stallworth    Exercises  - Bilateral Short Arc Quad Set  - 1-2 x daily - 7 x weekly - 2-3 sets - 10 reps - 5-10 hold  - Active Straight Leg Raise with Quad Set  - 1-2 x daily - 7 x weekly - 2-3 sets - 10 reps - 2-3 hold  - Supine Bridge  - 1-2 x daily - 7 x weekly - 2-3 sets - 10 reps - 2-3 hold  - Modified Goyo Stretch  - 1-2 x daily - 7 x weekly - 1 sets - 3-5 reps - 30 hold      ASSESSMENT:   ***     PLAN:  ***    OP PT PLAN:  Treatment/Interventions: Blood Flow Restriction Therapy  , Gait training , Manual Therapy  , Neuromuscular re-education , Taping techniques , and Therapeutic exercise    PT Plan: Skilled PT   PT Frequency: 1 time per week   Duration:4-6 weeks  Visits Approved: 25 visits  Rehab Potential: Good  Plan of Care Agreement: Patient         Goals:    Pt will report pain as 0/10 in both knees at rest to indicate healing and rest  Pt will demonstrate strong and painfree resisted knee ext to demonstrate improved strength of the patellar tendon  Pt will report she is able to walk 60+ minutes without onset of bilateral knee pain to resume prior level of function  Pt will demonstrate compliance with HEP to be discharged to HEP

## 2024-07-23 ENCOUNTER — APPOINTMENT (OUTPATIENT)
Dept: PHYSICAL THERAPY | Facility: CLINIC | Age: 44
End: 2024-07-23
Payer: COMMERCIAL

## 2024-07-29 ENCOUNTER — APPOINTMENT (OUTPATIENT)
Dept: PHYSICAL THERAPY | Facility: CLINIC | Age: 44
End: 2024-07-29
Payer: COMMERCIAL

## 2024-07-30 ENCOUNTER — APPOINTMENT (OUTPATIENT)
Dept: DERMATOLOGY | Facility: CLINIC | Age: 44
End: 2024-07-30
Payer: COMMERCIAL

## 2024-08-05 ENCOUNTER — APPOINTMENT (OUTPATIENT)
Dept: PHYSICAL THERAPY | Facility: CLINIC | Age: 44
End: 2024-08-05
Payer: COMMERCIAL

## 2024-08-08 DIAGNOSIS — K21.9 GASTROESOPHAGEAL REFLUX DISEASE WITHOUT ESOPHAGITIS: ICD-10-CM

## 2024-08-08 RX ORDER — LANSOPRAZOLE 30 MG/1
30 CAPSULE, DELAYED RELEASE ORAL
Qty: 90 CAPSULE | Refills: 0 | Status: SHIPPED | OUTPATIENT
Start: 2024-08-08

## 2024-08-09 ENCOUNTER — APPOINTMENT (OUTPATIENT)
Dept: ORTHOPEDIC SURGERY | Facility: CLINIC | Age: 44
End: 2024-08-09
Payer: COMMERCIAL

## 2024-08-12 ENCOUNTER — APPOINTMENT (OUTPATIENT)
Dept: PHYSICAL THERAPY | Facility: CLINIC | Age: 44
End: 2024-08-12
Payer: COMMERCIAL

## 2024-08-21 ENCOUNTER — TREATMENT (OUTPATIENT)
Dept: PHYSICAL THERAPY | Facility: CLINIC | Age: 44
End: 2024-08-21
Payer: COMMERCIAL

## 2024-08-21 DIAGNOSIS — S80.02XA CONTUSION OF LEFT KNEE: Primary | ICD-10-CM

## 2024-08-21 DIAGNOSIS — S80.01XA CONTUSION OF RIGHT KNEE: ICD-10-CM

## 2024-08-21 PROCEDURE — 97110 THERAPEUTIC EXERCISES: CPT | Mod: GP

## 2024-08-21 NOTE — PROGRESS NOTES
"  Physical Therapy    Physical Therapy Treatment    Patient Name: Ofelia Wiggins  MRN: [id number]  Today's Date: 8/22/2024  Time Calculation  Start Time: 1650  Stop Time: 1730  Time Calculation (min): 40 min  PT Therapeutic Procedures Time Entry  Therapeutic Exercise Time Entry: 40'     Insurance:  Visit number: 2 of 25  Authorization info: No auth  Insurance Type: Payor: [employer] / Plan: [employer] / Product Type: *No Product type* /     Current Problem  1. Contusion of left knee  Follow Up In Physical Therapy      2. Contusion of right knee  Follow Up In Physical Therapy            General    Precautions  Left hip pain    SUBJECTIVE:   Patient reports she is feeling much better since her last visit on 7/1/2024. HEP is going well and is helpful. Has very little discomfort entering the clinic today. Wants to continue to focus on strengthening.    Imaging:   Xrays Right, Left Knee 06/2024  IMPRESSION: Normal radiographs of the knees     Pain:   1-2/10    OBJECTIVE:    Palpation: Not TTP B PT insertions    Hip AROM: > WNL all planes R/L   HEIDI: Negative  FADIR: Negative    Knee AROM: WFL R/L, +pain free at end range    Knee Strength:  Extension: R 5/5 L 5/5 mild \"discomfort\" reported but not familiar pain  SLR: able to perform 10+ reps B pain free w/o quad lag. Feels stronger than at time of her eval.   Mild weight shifting off of LLE onto RLE w/ sit to stands that can be corrected w/ verbal cues.      There-ex: 40'  TKEs 2x10 per side w/ yellow perform better TB *  Sit to stand 3x10* w/ arms crossed, mild weight shifting off LLE.  Seated figure 4 stretch 2x30\" per side*  Bridges x10*  Single leg bridge 2x5 per side*  Clamshell w/ green TB 2x8 per side*  Reviewed HEP     * = added to HEP.  Sheet with exercise descriptions and images issued.  Skilled intervention utilized in the appropriate selection & application of above exercises.  Verbal and tactile cues provided for proper form and technique.  Pt. " "demonstrated appropriate form & verbalized understanding of optimal technique for above exercises.    [facility].[employer]    Access Code: [email or web address]      ASSESSMENT:   Patient tolerated today's session w/ expected muscle fatigue. Demonstrates improvements in quad strength and activity tolerance through progression of there-ex w/o a return of familiar pain. Patient had difficulty w/ TKE's due to mild knee \"discomfort\" that resolved w/ continued repetitions. Patient is progressing and will benefit from ongoing PT services to continue patellar tendon loading and hip strengthening as tolerated to reach established goals to return to PLOF.       PLAN:  Trial of eccentric exercises for patellar tendon. Side stepping, step downs for hip strengthening.         Goals:   Pt will report pain as 0/10 in both knees at rest to indicate healing and rest: Progressing  Pt will demonstrate strong and painfree resisted knee ext to demonstrate improved strength of the patellar tendon: MET  Pt will report she is able to walk 60+ minutes without onset of bilateral knee pain to resume prior level of function: Progressing  Pt will demonstrate compliance with HEP to be discharged to HEP: MET    "

## 2024-08-22 PROBLEM — S80.02XA CONTUSION OF LEFT KNEE: Status: ACTIVE | Noted: 2024-08-22

## 2024-08-22 PROBLEM — S80.01XA CONTUSION OF RIGHT KNEE: Status: ACTIVE | Noted: 2024-08-22

## 2024-08-26 ENCOUNTER — APPOINTMENT (OUTPATIENT)
Dept: PHYSICAL THERAPY | Facility: CLINIC | Age: 44
End: 2024-08-26
Payer: COMMERCIAL

## 2024-08-26 DIAGNOSIS — S80.02XD CONTUSION OF LEFT KNEE, SUBSEQUENT ENCOUNTER: Primary | ICD-10-CM

## 2024-08-26 DIAGNOSIS — S80.01XD CONTUSION OF RIGHT KNEE, SUBSEQUENT ENCOUNTER: ICD-10-CM

## 2024-08-30 ENCOUNTER — APPOINTMENT (OUTPATIENT)
Dept: ORTHOPEDIC SURGERY | Facility: CLINIC | Age: 44
End: 2024-08-30
Payer: COMMERCIAL

## 2024-08-30 VITALS — HEIGHT: 66 IN | WEIGHT: 129 LBS | BODY MASS INDEX: 20.73 KG/M2

## 2024-08-30 DIAGNOSIS — M22.2X2 PATELLOFEMORAL ARTHRALGIA OF BOTH KNEES: Primary | ICD-10-CM

## 2024-08-30 DIAGNOSIS — M22.2X1 PATELLOFEMORAL ARTHRALGIA OF BOTH KNEES: Primary | ICD-10-CM

## 2024-08-30 ASSESSMENT — PAIN DESCRIPTION - DESCRIPTORS: DESCRIPTORS: ACHING;SORE

## 2024-08-30 ASSESSMENT — PAIN - FUNCTIONAL ASSESSMENT: PAIN_FUNCTIONAL_ASSESSMENT: 0-10

## 2024-08-30 ASSESSMENT — PAIN SCALES - GENERAL: PAINLEVEL_OUTOF10: 5 - MODERATE PAIN

## 2024-09-04 ENCOUNTER — TREATMENT (OUTPATIENT)
Dept: PHYSICAL THERAPY | Facility: CLINIC | Age: 44
End: 2024-09-04
Payer: COMMERCIAL

## 2024-09-04 DIAGNOSIS — S80.02XA CONTUSION OF LEFT KNEE: ICD-10-CM

## 2024-09-04 DIAGNOSIS — S80.01XA CONTUSION OF RIGHT KNEE: ICD-10-CM

## 2024-09-04 DIAGNOSIS — S80.01XD CONTUSION OF RIGHT KNEE, SUBSEQUENT ENCOUNTER: Primary | ICD-10-CM

## 2024-09-04 DIAGNOSIS — S80.02XD CONTUSION OF LEFT KNEE, SUBSEQUENT ENCOUNTER: ICD-10-CM

## 2024-09-04 PROCEDURE — 97110 THERAPEUTIC EXERCISES: CPT | Mod: GP,CQ

## 2024-09-04 NOTE — PROGRESS NOTES
"  Physical Therapy    Physical Therapy Treatment    Patient Name: Ofelia Wiggins  MRN: [id number]  Today's Date: 9/4/2024  Time Calculation  Start Time: 1615  Stop Time: 1700  Time Calculation (min): 45 min      Insurance:  Visit number: 3 of 12  Authorization info: 7/1/24-10/11/2024  Insurance Type: Payor: [employer] / Plan: [employer] / Product Type: *No Product type* /     Current Problem  1. Contusion of right knee, subsequent encounter        2. Contusion of left knee, subsequent encounter        3. Contusion of left knee  Follow Up In Physical Therapy      4. Contusion of right knee  Follow Up In Physical Therapy            General    Precautions  Left hip pain    SUBJECTIVE:   Patient reports that she saw dr. Meyer.  I had increased knee pain more on L after last visit.     Imaging:   Xrays Right, Left Knee 06/2024  IMPRESSION: Normal radiographs of the knees     Pain:   1-2/10    OBJECTIVE:    Palpation: Not TTP B PT insertions    Hip AROM: > WNL all planes R/L   HEIDI: Negative  FADIR: Negative    Knee AROM: WFL R/L, +pain free at end range    Knee Strength:  Extension: R 5/5 L 5/5 mild \"discomfort\" reported but not familiar pain  SLR: able to perform 10+ reps B pain free w/o quad lag. Feels stronger than at time of her eval.   Mild weight shifting off of LLE onto RLE w/ sit to stands that can be corrected w/ verbal cues.      There-ex: 45'  Rec bike x 5 min   TKEs 2x10 per side w/ yellow perform better TB *  Sit to stand 3x10* w/ arms crossed (only touching chair and pop back up)  Abd sliders 2 x 10  Seated figure 4 stretch 2x30\" per side  Bridges with add [id number]  Single leg bridge x 10  per side  Reviewed HEP     * = added to HEP.  Sheet with exercise descriptions and images issued.  Skilled intervention utilized in the appropriate selection & application of above exercises.  Verbal and tactile cues provided for proper form and technique.  Pt. demonstrated appropriate form & verbalized " understanding of optimal technique for above exercises.    [facility].[employer]    Access Code: [email or web address]      ASSESSMENT:   Patient tolerated today's session w/ expected muscle fatigue. She has increased knee discomfort with use of resistance so we are holding any band exercises for the time being.,  She did well with addition of add with bridge and abd sliders.  Patient is progressing and will benefit from ongoing PT services to continue patellar tendon loading and hip strengthening as tolerated to reach established goals to return to PLOF.       PLAN:  Cont with strengthening in pain free range         Goals:   Pt will report pain as 0/10 in both knees at rest to indicate healing and rest: Progressing  Pt will demonstrate strong and painfree resisted knee ext to demonstrate improved strength of the patellar tendon: MET  Pt will report she is able to walk 60+ minutes without onset of bilateral knee pain to resume prior level of function: Progressing  Pt will demonstrate compliance with HEP to be discharged to HEP: MET

## 2024-09-04 NOTE — PROGRESS NOTES
Ofelia Wiggins  is a 44 y.o. year-old  female. she reports was doing well but had a fall on her knee that set her back, had to decrease intensity in PT.      Past Medical, Family, and Social History reviewed   Review of Systems  A complete review of systems was conducted, pertinent only to the HPI noted above.  Constitutional: None  Eyes: No additions to above history  Ears, Nose, Throat: No additions to above history  Cardiovascular: No additions to above history  Respiratory: No additions to above history  GI: No additions to above history  : No additions to above history  Skin/Neuro: No additions to above history  Endocrine/Heme/Lymph: No additions to above history  Immunologic: No additions to above history  Psychiatric: No additions to above history  Musculoskeletal: see above    GEN: Alert and Oriented x 3  Constitutional: Well appearing , in no apparent distress.  Skin: No rashes, erythema, or induration around knee    MUSCULOSKELETAL EXAM:     Bilateral KNEE:  ROM: 3/0/140  Effusion: negative  Alignment: [neutral]      Gait: [normal]  Sensation intact bilaterally sural/saphenous/sp/dp/tibial nerve bilaterally  Motor 5/5 knee flexion/extension/foot DF/PF/EHL/FHL bilaterally  Palpable/symmetric DP and PT pulse bilaterally      PATELLAR/EXTENSOR MECHANISM:   KNEE:  Patellar Crepitus: n  Patellar Compression Pain:yes  Patellar Apprehension: [no]  Extensor Mechanism: [intact]  Straight Leg Raise: good  Tender to palpation over her anterior knee no bursal fluid collection, healing scab    LIGAMENTS:  ACL: Lachmans: [negative]  PCL: [stable]  Valgus at 0 degrees: [stable]  Valgus at 30 degrees: [stable]  Varus at 0 degrees: [stable]  Varus at 30 degrees: [stable]    MENISCUS EXAM:  Joint Line Tenderness:neg  McMurrays: [negative]  Pain with Deep Flexion: [No]    Xrays independently viewed and interpreted: No evidence of fracture or degenerative changes    Reviewed would allow acute pain to subside and return to  therapy and progress as tolerated.     All of the patient's questions and concerns were addressed today, but they know they can contact our office at any time with any further concerns.

## 2024-09-10 ENCOUNTER — TELEPHONE (OUTPATIENT)
Dept: DERMATOLOGY | Facility: CLINIC | Age: 44
End: 2024-09-10
Payer: COMMERCIAL

## 2024-09-10 NOTE — TELEPHONE ENCOUNTER
Called patient to ask her to please schedule an appointment per Dr. Baldwin. She said she will call back.

## 2024-09-10 NOTE — TELEPHONE ENCOUNTER
Pt left message for Dr Baldwin that her prescription rf for Triamcinolone needs to be sent to [facility] , I have entered pharmacy in pt chart ..

## 2024-09-17 ENCOUNTER — APPOINTMENT (OUTPATIENT)
Dept: DERMATOLOGY | Facility: CLINIC | Age: 44
End: 2024-09-17
Payer: COMMERCIAL

## 2024-09-17 DIAGNOSIS — Z12.83 SCREENING EXAM FOR SKIN CANCER: ICD-10-CM

## 2024-09-17 DIAGNOSIS — L30.9 HAND DERMATITIS: ICD-10-CM

## 2024-09-17 DIAGNOSIS — L57.8 ACTINIC SKIN DAMAGE: ICD-10-CM

## 2024-09-17 DIAGNOSIS — L71.9 ROSACEA: ICD-10-CM

## 2024-09-17 DIAGNOSIS — R23.3 PETECHIAE: ICD-10-CM

## 2024-09-17 DIAGNOSIS — D22.9 MULTIPLE BENIGN NEVI: Primary | ICD-10-CM

## 2024-09-17 DIAGNOSIS — L81.4 LENTIGO: ICD-10-CM

## 2024-09-17 PROCEDURE — 99214 OFFICE O/P EST MOD 30 MIN: CPT | Performed by: DERMATOLOGY

## 2024-09-17 RX ORDER — METRONIDAZOLE 7.5 MG/G
1 CREAM TOPICAL 2 TIMES DAILY
Qty: 45 G | Refills: 11 | Status: SHIPPED | OUTPATIENT
Start: 2024-09-17

## 2024-09-17 ASSESSMENT — PATIENT GLOBAL ASSESSMENT (PGA): PATIENT GLOBAL ASSESSMENT: PATIENT GLOBAL ASSESSMENT:  1 - CLEAR

## 2024-09-17 ASSESSMENT — DERMATOLOGY PATIENT ASSESSMENT
ARE YOU TRYING TO GET PREGNANT: NO
DO YOU USE SUNSCREEN: OCCASIONALLY
HAVE YOU HAD OR DO YOU HAVE A STAPH INFECTION: NO
DO YOU HAVE ANY NEW OR CHANGING LESIONS: NO
DO YOU HAVE IRREGULAR MENSTRUAL CYCLES: NO
ARE YOU AN ORGAN TRANSPLANT RECIPIENT: NO
ARE YOU ON BIRTH CONTROL: NO
HAVE YOU HAD OR DO YOU HAVE VASCULAR DISEASE: NO
DO YOU USE A TANNING BED: NO

## 2024-09-17 ASSESSMENT — DERMATOLOGY QUALITY OF LIFE (QOL) ASSESSMENT
DATE THE QUALITY-OF-LIFE ASSESSMENT WAS COMPLETED: 67100
RATE HOW BOTHERED YOU ARE BY SYMPTOMS OF YOUR SKIN PROBLEM (EG, ITCHING, STINGING BURNING, HURTING OR SKIN IRRITATION): 0 - NEVER BOTHERED
ARE THERE EXCLUSIONS OR EXCEPTIONS FOR THE QUALITY OF LIFE ASSESSMENT: NO
RATE HOW EMOTIONALLY BOTHERED YOU ARE BY YOUR SKIN PROBLEM (FOR EXAMPLE, WORRY, EMBARRASSMENT, FRUSTRATION): 0 - NEVER BOTHERED
RATE HOW BOTHERED YOU ARE BY EFFECTS OF YOUR SKIN PROBLEMS ON YOUR ACTIVITIES (EG, GOING OUT, ACCOMPLISHING WHAT YOU WANT, WORK ACTIVITIES OR YOUR RELATIONSHIPS WITH OTHERS): 0 - NEVER BOTHERED

## 2024-09-17 ASSESSMENT — ITCH NUMERIC RATING SCALE: HOW SEVERE IS YOUR ITCHING?: 0

## 2024-09-17 NOTE — PATIENT INSTRUCTIONS
YOUR TOPICAL ROSACEA TREATMENT PLAN    Morning:    Wash face/body with:    Gentle, non-medicated wash (Examples: Cetaphil, CeraVe, Neutrogena UltraGentle)             2. Apply medication(s):  Apply _____METRONIDAZOLE CR________________to affected areas of ____ face _____ chest ____ back         3. Moisturize:  If dry, apply non-scented, non-comedogenic moisturizer of your choice to affected areas. Picking a moisturizer with SPF 30+ to use in the morning will help dark marks fade faster. Examples: Neutrogena, Cetaphil, CeraVe, EltaMD (online)        Evening:    Wash face with:    Gentle, non-medicated wash (Examples: Cetaphil, CeraVe, Neutrogena UltraGentle)           2. Apply medication(s):  Apply ____METRONIDAZOLE CR_________________to affected areas of ____ face _____ chest ____ back         3. Moisturize:  If dry, apply non-scented, non-comedogenic moisturizer of your choice to affected areas. Examples: Neutrogena, Cetaphil, CeraVe, EltaMD (online)

## 2024-09-17 NOTE — PROGRESS NOTES
Subjective     Elmira Wiggins is a 44 y.o. female who presents for the following: Skin Check. Last derm visit 8/10/23 for Full Skin Exam - atypical nevi x2 on right upper arm and right anterior thigh being monitored with digital dermoscopy    History of hidradenitis suppurativa in left inguinal fold s/p surgery 4/3/23 with sinus tract unroofed healed by secondary intention.     Review of Systems:  No other skin or systemic complaints other than what is documented elsewhere in the note.    The following portions of the chart were reviewed this encounter and updated as appropriate:  Tobacco  Allergies  Meds  Problems  Med Hx  Surg Hx         Skin Cancer History  No skin cancer on file.      Specialty Problems          Dermatology Problems    Contusion of left knee    Contusion of right knee        Objective   Well appearing patient in no apparent distress; mood and affect are within normal limits.    A full examination was performed including scalp, head, eyes, ears, nose, lips, neck, chest, axillae, abdomen, back, buttocks, bilateral upper extremities, bilateral lower extremities, hands, feet, fingers, toes, fingernails, and toenails. All findings within normal limits unless otherwise noted below.    Assessment/Plan   1. Multiple benign nevi  Brown and tan macules and papules with reassuring findings on dermoscopy    atypical nevi x2 on right upper arm and right anterior thigh being monitored with digital dermoscopy - stable as compared to photos    -These lesions have benign, reassuring patterns on dermoscopy  -Recommend continued self observation, and to contact the office if any changes in nevi are noticed    2. Lentigo  Tan macules    -Benign appearing on exam  -Reassurance, recommend observation    3. Actinic skin damage  Background of photodamage with hyper- and hypo-pigmented macules on the skin    4. Screening exam for skin cancer      Full body skin exam  -No lesions concerning for malignancy on the  remainder the skin exam today   - The ugly duckling sign was discussed. Monitor for any skin lesions that are different in color, shape, or size than others on body  -Sun protection was discussed. Recommend SPF 30+, hats with brims, sun protective clothing, and avoiding sun exposure between 10 AM and 2 PM whenever possible  -Recommend regular skin exams or sooner if new or changing lesions       Related Procedures  Follow Up In Dermatology - Established Patient    5. Rosacea  Head - Anterior (Face)  Erythema and telangiectasias, many on face    She reports dryness and itching and has been using triamcinolone periodically    -Discussed nature of this chronic condition  -Recommend gentle skin care habits including gentle cleansers, non-comedogenic physical/mineral based sunscreen daily. Avoid exfoliation, wind and extreme temperatures when possible.    - stop triamcinolone since that can just make rosacea worse. I am also concerned that it is contributing to her petechiae    - start metronidazole cream  - discussed adding doxycycline PO but she has had this before for hidradenitis suppurativa and it upset her stomach too much    metroNIDAZOLE (Metrocream) 0.75 % cream - Head - Anterior (Face)  Apply 1 Application topically 2 times a day. To face for 2-3 months and then continue or use as needed    Related Procedures  Follow Up In Dermatology - Established Patient    6. Hand dermatitis  Left Hand - Posterior, Right Hand - Posterior  Dryness, no erythema today    Continue triamcinolone as needed. For hands only, not face    Related Medications  triamcinolone (Kenalog) 0.1 % cream  Apply topically 2 times a day. To affected areas of hands for up to 2 weeks.    7. Petechiae  Right Buccal Cheek  Photos of purple macules in linear distribution     Fragility of skin from scratching,  - concern that this is related to topical steroid use on face  - stop topical steroid on face (see rosacea plan)  - also check CBC, she has  upcoming appt with PCP and will check at that time as part of her annual. I offered to put in the order but she said she will remember to ask Dr. Blount          Follow up 1 year Full Skin Exam  Follow up 3 months rosacea if not improving

## 2024-09-18 PROBLEM — S80.02XA CONTUSION OF LEFT KNEE: Status: RESOLVED | Noted: 2024-08-22 | Resolved: 2024-09-18

## 2024-09-23 ENCOUNTER — APPOINTMENT (OUTPATIENT)
Dept: PHYSICAL THERAPY | Facility: CLINIC | Age: 44
End: 2024-09-23
Payer: COMMERCIAL

## 2024-09-26 ENCOUNTER — APPOINTMENT (OUTPATIENT)
Dept: RADIOLOGY | Facility: CLINIC | Age: 44
End: 2024-09-26
Payer: COMMERCIAL

## 2024-09-26 ENCOUNTER — APPOINTMENT (OUTPATIENT)
Dept: SURGICAL ONCOLOGY | Facility: CLINIC | Age: 44
End: 2024-09-26
Payer: COMMERCIAL

## 2024-09-26 DIAGNOSIS — F41.9 ANXIETY: Primary | ICD-10-CM

## 2024-09-27 RX ORDER — CLONIDINE HYDROCHLORIDE 0.1 MG/1
0.1 TABLET ORAL 2 TIMES DAILY
Qty: 180 TABLET | Refills: 0 | Status: SHIPPED | OUTPATIENT
Start: 2024-09-27

## 2024-09-30 ENCOUNTER — OFFICE VISIT (OUTPATIENT)
Dept: OBSTETRICS AND GYNECOLOGY | Facility: CLINIC | Age: 44
End: 2024-09-30
Payer: COMMERCIAL

## 2024-09-30 VITALS
DIASTOLIC BLOOD PRESSURE: 93 MMHG | SYSTOLIC BLOOD PRESSURE: 145 MMHG | HEIGHT: 66 IN | HEART RATE: 73 BPM | BODY MASS INDEX: 21.21 KG/M2 | WEIGHT: 132 LBS

## 2024-09-30 DIAGNOSIS — R03.0 ELEVATED BLOOD PRESSURE READING IN OFFICE WITHOUT DIAGNOSIS OF HYPERTENSION: ICD-10-CM

## 2024-09-30 DIAGNOSIS — Z11.3 ROUTINE SCREENING FOR STI (SEXUALLY TRANSMITTED INFECTION): ICD-10-CM

## 2024-09-30 DIAGNOSIS — Z01.419 WELL WOMAN EXAM WITH ROUTINE GYNECOLOGICAL EXAM: Primary | ICD-10-CM

## 2024-09-30 PROCEDURE — 87491 CHLMYD TRACH DNA AMP PROBE: CPT | Performed by: OBSTETRICS & GYNECOLOGY

## 2024-09-30 PROCEDURE — 99396 PREV VISIT EST AGE 40-64: CPT | Performed by: OBSTETRICS & GYNECOLOGY

## 2024-09-30 PROCEDURE — 87661 TRICHOMONAS VAGINALIS AMPLIF: CPT | Performed by: OBSTETRICS & GYNECOLOGY

## 2024-09-30 PROCEDURE — 3008F BODY MASS INDEX DOCD: CPT | Performed by: OBSTETRICS & GYNECOLOGY

## 2024-09-30 ASSESSMENT — COLUMBIA-SUICIDE SEVERITY RATING SCALE - C-SSRS
1. IN THE PAST MONTH, HAVE YOU WISHED YOU WERE DEAD OR WISHED YOU COULD GO TO SLEEP AND NOT WAKE UP?: NO
6. HAVE YOU EVER DONE ANYTHING, STARTED TO DO ANYTHING, OR PREPARED TO DO ANYTHING TO END YOUR LIFE?: NO
2. HAVE YOU ACTUALLY HAD ANY THOUGHTS OF KILLING YOURSELF?: NO

## 2024-09-30 ASSESSMENT — PAIN SCALES - GENERAL: PAINLEVEL: 0-NO PAIN

## 2024-09-30 ASSESSMENT — PATIENT HEALTH QUESTIONNAIRE - PHQ9
1. LITTLE INTEREST OR PLEASURE IN DOING THINGS: NOT AT ALL
2. FEELING DOWN, DEPRESSED OR HOPELESS: NOT AT ALL
SUM OF ALL RESPONSES TO PHQ9 QUESTIONS 1 AND 2: 0

## 2024-09-30 NOTE — PROGRESS NOTES
"Assessment   Ofelia \"Elmira\" was seen today for annual exam.  Diagnoses and all orders for this visit:  Well woman exam with routine gynecological exam (Primary)  Routine screening for STI (sexually transmitted infection)  -     C. trachomatis / N. gonorrhoeae, Amplified  -     Trichomonas vaginalis, Amplified  Elevated blood pressure reading in office without diagnosis of hypertension     Pt encouraged to check home BP and if elevated, follow up with PCP    FU 1 year    Zoie Olsen MD    Subjective   44 year old who presents for an annual gynecological exam. PCP = Pamela DASH Concerns: none     Other Concerns: none     OBHx:  (PUL with abn rising quants treated with MTX in 2022). Pt does not desire to actively pursue pregnancy any further.     GynHx:  Menstrual Hx: regular cycles q28 days lasting 5-7 days without menorrhagia or dysmenorrhea  STIs: denies h/o  Contraception: offered and declined.  Sexual History/Complaints: men, monogamous, new partner since 2024     PapHx:  h/o LEEP  Dec 2019 - neg cotest  2014 - neg cotest  2023 - neg cytology     Preventative:  Last mammogram: BIRAD Cat 2 2023. Nl MRI 2024. Next MMG due Dec 2024 . Follows with [facility].   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 No hirsutism.   Neck: Supple. No visible thyromegaly.    Breast/Axilla: Normal to palpation bilaterally without masses, skin changes, or nipple discharge.    Abdomen: Soft, non-tender, without masses or organomegaly   Vulva: Normal architecture without erythema, masses, or lesions.    Vagina: Normal mucosa without lesions, masses, or atrophy. No abnormal vaginal discharge.    Cervix: Normal without masses, lesions, or signs of cervicitis.    Uterus: Normal mobile, non-enlarged uterus    Adnexa: Normal without masses or lesions   Pelvic Floor No POP noted. No high tone pelvic floor    Psych Normal affect. Normal mood.       "

## 2024-10-01 LAB
C TRACH RRNA SPEC QL NAA+PROBE: NEGATIVE
N GONORRHOEA DNA SPEC QL PROBE+SIG AMP: NEGATIVE
T VAGINALIS RRNA SPEC QL NAA+PROBE: NEGATIVE

## 2024-10-09 ENCOUNTER — TREATMENT (OUTPATIENT)
Dept: PHYSICAL THERAPY | Facility: CLINIC | Age: 44
End: 2024-10-09
Payer: COMMERCIAL

## 2024-10-09 DIAGNOSIS — S80.02XD CONTUSION OF LEFT KNEE, SUBSEQUENT ENCOUNTER: ICD-10-CM

## 2024-10-09 DIAGNOSIS — S80.02XA CONTUSION OF LEFT KNEE: ICD-10-CM

## 2024-10-09 DIAGNOSIS — S80.01XA CONTUSION OF RIGHT KNEE: ICD-10-CM

## 2024-10-09 DIAGNOSIS — S80.01XD CONTUSION OF RIGHT KNEE, SUBSEQUENT ENCOUNTER: Primary | ICD-10-CM

## 2024-10-09 PROCEDURE — 97110 THERAPEUTIC EXERCISES: CPT | Mod: GP

## 2024-10-09 ASSESSMENT — PAIN SCALES - GENERAL: PAINLEVEL_OUTOF10: 0 - NO PAIN

## 2024-10-09 ASSESSMENT — PAIN - FUNCTIONAL ASSESSMENT: PAIN_FUNCTIONAL_ASSESSMENT: 0-10

## 2024-10-09 NOTE — PROGRESS NOTES
"  Physical Therapy    Physical Therapy Treatment    Patient Name: Ofelia Wiggins  MRN: [id number]  Today's Date: 10/9/2024  Time Calculation  Start Time: 1648  Stop Time: 1730  Time Calculation (min): 42 min  PT Therapeutic Procedures Time Entry  Therapeutic Exercise Time Entry: 42'        Insurance:  Visit number: 4 of 12  Authorization info: 7/1/24-10/11/2024  Insurance Type: Payor: [employer] / Plan: [employer] / Product Type: *No Product type* /     Current Problem  1. Contusion of right knee, subsequent encounter        2. Contusion of left knee, subsequent encounter        3. Contusion of left knee  Follow Up In Physical Therapy      4. Contusion of right knee  Follow Up In Physical Therapy              General    Precautions  Left hip pain    SUBJECTIVE:   Patient reports she is feeling better now that she has been consistently performing her exercises. 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     Imaging:   Xrays Right, Left Knee 06/2024  IMPRESSION: Normal radiographs of the knees     Pain:   0/10    OBJECTIVE:    Palpation: Not TTP B PT insertions    Hip AROM: > WNL all planes R/L   HEIDI: Negative  FADIR: Negative    Knee AROM: WFL R/L, +pain free at end range    Knee Strength:  Extension: R 5/5 L 5/5 mild \"discomfort\" reported but not familiar pain  SLR: able to perform 10+ reps B pain free w/o quad lag. Feels stronger than at time of her eval.   Mild weight shifting off of LLE onto RLE w/ sit to stands that can be corrected w/ verbal cues.      There-ex: 42'  Rec bike x 5 min   DL shuttle leg press w/ 50# 3x12  Leg curls w/ 26# x10, poor tolerance. discontinued  Bridges on yellow physio-ball 3x10  Level 2 clamshells 3x10 per side  Cable pallof SLS iso holds w/ 2.5# 3x30\" per side*  Yellow stroops TB pallof walk outs 2x10 per side      * = added to HEP.  Sheet with exercise descriptions and images issued.  Skilled intervention utilized in the " appropriate selection & application of above exercises.  Verbal and tactile cues provided for proper form and technique.  Pt. demonstrated appropriate form & verbalized understanding of optimal technique for above exercises.    [facility].[email or web address]    Access Code: [email or web address]      ASSESSMENT:   Patient tolerated today's session w/ expected muscle fatigue. Demonstrates improvements in activity tolerance and LE strength displayed through progression of there-ex to include SLS and level 2 clamshells w/o a production of familiar pain. Patient had difficulty w/ leg curls due to anterior knee discomfort, which did not limit participation in remainder of session. Patient is progressing and is doing much better since her last visit last month. She will benefit from ongoing PT services to progress LE strengthening and tendon loading as tolerated to reach established goals to return to PLOF.          PLAN:  Cont with strengthening in pain free range. Progress patellar tendon loading and hamstring strengthening as tolerated. Trial of eccentrics.          Goals:   Pt will report pain as 0/10 in both knees at rest to indicate healing and rest: Progressing  Pt will demonstrate strong and painfree resisted knee ext to demonstrate improved strength of the patellar tendon: MET  Pt will report she is able to walk 60+ minutes without onset of bilateral knee pain to resume prior level of function: Progressing  Pt will demonstrate compliance with HEP to be discharged to HEP: MET

## 2024-10-11 ENCOUNTER — APPOINTMENT (OUTPATIENT)
Dept: ORTHOPEDIC SURGERY | Facility: CLINIC | Age: 44
End: 2024-10-11
Payer: COMMERCIAL

## 2024-10-15 ENCOUNTER — APPOINTMENT (OUTPATIENT)
Dept: PRIMARY CARE | Facility: CLINIC | Age: 44
End: 2024-10-15
Payer: COMMERCIAL

## 2024-10-18 ENCOUNTER — LAB (OUTPATIENT)
Dept: LAB | Facility: LAB | Age: 44
End: 2024-10-18
Payer: COMMERCIAL

## 2024-10-18 ENCOUNTER — APPOINTMENT (OUTPATIENT)
Dept: PRIMARY CARE | Facility: CLINIC | Age: 44
End: 2024-10-18
Payer: COMMERCIAL

## 2024-10-18 VITALS
BODY MASS INDEX: 21.38 KG/M2 | SYSTOLIC BLOOD PRESSURE: 128 MMHG | WEIGHT: 133 LBS | HEIGHT: 66 IN | HEART RATE: 78 BPM | DIASTOLIC BLOOD PRESSURE: 86 MMHG

## 2024-10-18 DIAGNOSIS — Z12.12 SCREENING FOR COLORECTAL CANCER: ICD-10-CM

## 2024-10-18 DIAGNOSIS — Z00.00 ROUTINE GENERAL MEDICAL EXAMINATION AT A HEALTH CARE FACILITY: ICD-10-CM

## 2024-10-18 DIAGNOSIS — Z00.00 ROUTINE GENERAL MEDICAL EXAMINATION AT A HEALTH CARE FACILITY: Primary | ICD-10-CM

## 2024-10-18 DIAGNOSIS — Z12.11 SCREENING FOR COLORECTAL CANCER: ICD-10-CM

## 2024-10-18 PROBLEM — H50.9 STRABISMUS: Status: ACTIVE | Noted: 2023-11-14

## 2024-10-18 PROBLEM — H16.229 KERATOCONJUNCTIVITIS SICCA: Status: ACTIVE | Noted: 2023-11-10

## 2024-10-18 PROBLEM — H25.10 NUCLEAR SCLEROSIS: Status: ACTIVE | Noted: 2023-11-10

## 2024-10-18 PROBLEM — H52.10 HIGH MYOPIA: Status: ACTIVE | Noted: 2023-11-10

## 2024-10-18 PROBLEM — N87.1 CERVICAL INTRAEPITHELIAL NEOPLASIA GRADE 2: Status: ACTIVE | Noted: 2024-10-18

## 2024-10-18 LAB
25(OH)D3 SERPL-MCNC: 67 NG/ML (ref 30–100)
ALBUMIN SERPL BCP-MCNC: 4.2 G/DL (ref 3.4–5)
ALP SERPL-CCNC: 48 U/L (ref 33–110)
ALT SERPL W P-5'-P-CCNC: 19 U/L (ref 7–45)
ANION GAP SERPL CALC-SCNC: 10 MMOL/L (ref 10–20)
AST SERPL W P-5'-P-CCNC: 23 U/L (ref 9–39)
BASOPHILS # BLD AUTO: 0.07 X10*3/UL (ref 0–0.1)
BASOPHILS NFR BLD AUTO: 0.6 %
BILIRUB SERPL-MCNC: 0.4 MG/DL (ref 0–1.2)
BUN SERPL-MCNC: 13 MG/DL (ref 6–23)
CALCIUM SERPL-MCNC: 10 MG/DL (ref 8.6–10.6)
CHLORIDE SERPL-SCNC: 100 MMOL/L (ref 98–107)
CHOLEST SERPL-MCNC: 191 MG/DL (ref 0–199)
CHOLESTEROL/HDL RATIO: 2.8
CO2 SERPL-SCNC: 30 MMOL/L (ref 21–32)
CREAT SERPL-MCNC: 0.79 MG/DL (ref 0.5–1.05)
EGFRCR SERPLBLD CKD-EPI 2021: >90 ML/MIN/1.73M*2
EOSINOPHIL # BLD AUTO: 0.22 X10*3/UL (ref 0–0.7)
EOSINOPHIL NFR BLD AUTO: 1.9 %
ERYTHROCYTE [DISTWIDTH] IN BLOOD BY AUTOMATED COUNT: 14.5 % (ref 11.5–14.5)
FERRITIN SERPL-MCNC: 23 NG/ML (ref 8–150)
FOLATE SERPL-MCNC: 22.3 NG/ML
GLUCOSE SERPL-MCNC: 76 MG/DL (ref 74–99)
HCT VFR BLD AUTO: 39.8 % (ref 36–46)
HDLC SERPL-MCNC: 67.8 MG/DL
HGB BLD-MCNC: 12.8 G/DL (ref 12–16)
IMM GRANULOCYTES # BLD AUTO: 0.05 X10*3/UL (ref 0–0.7)
IMM GRANULOCYTES NFR BLD AUTO: 0.4 % (ref 0–0.9)
IRON SATN MFR SERPL: ABNORMAL %
IRON SERPL-MCNC: 27 UG/DL (ref 35–150)
LDLC SERPL CALC-MCNC: 101 MG/DL
LYMPHOCYTES # BLD AUTO: 2.24 X10*3/UL (ref 1.2–4.8)
LYMPHOCYTES NFR BLD AUTO: 19.2 %
MCH RBC QN AUTO: 28.9 PG (ref 26–34)
MCHC RBC AUTO-ENTMCNC: 32.2 G/DL (ref 32–36)
MCV RBC AUTO: 90 FL (ref 80–100)
MONOCYTES # BLD AUTO: 0.61 X10*3/UL (ref 0.1–1)
MONOCYTES NFR BLD AUTO: 5.2 %
NEUTROPHILS # BLD AUTO: 8.5 X10*3/UL (ref 1.2–7.7)
NEUTROPHILS NFR BLD AUTO: 72.7 %
NON HDL CHOLESTEROL: 123 MG/DL (ref 0–149)
NRBC BLD-RTO: 0 /100 WBCS (ref 0–0)
PLATELET # BLD AUTO: 254 X10*3/UL (ref 150–450)
POTASSIUM SERPL-SCNC: 4.2 MMOL/L (ref 3.5–5.3)
PROT SERPL-MCNC: 6.8 G/DL (ref 6.4–8.2)
RBC # BLD AUTO: 4.43 X10*6/UL (ref 4–5.2)
SODIUM SERPL-SCNC: 136 MMOL/L (ref 136–145)
TIBC SERPL-MCNC: ABNORMAL UG/DL
TRIGL SERPL-MCNC: 109 MG/DL (ref 0–149)
TSH SERPL-ACNC: 1.99 MIU/L (ref 0.44–3.98)
UIBC SERPL-MCNC: >450 UG/DL (ref 110–370)
VIT B12 SERPL-MCNC: 380 PG/ML (ref 211–911)
VLDL: 22 MG/DL (ref 0–40)
WBC # BLD AUTO: 11.7 X10*3/UL (ref 4.4–11.3)

## 2024-10-18 PROCEDURE — 82746 ASSAY OF FOLIC ACID SERUM: CPT

## 2024-10-18 PROCEDURE — 80061 LIPID PANEL: CPT

## 2024-10-18 PROCEDURE — 93000 ELECTROCARDIOGRAM COMPLETE: CPT | Performed by: INTERNAL MEDICINE

## 2024-10-18 PROCEDURE — 85025 COMPLETE CBC W/AUTO DIFF WBC: CPT

## 2024-10-18 PROCEDURE — 82306 VITAMIN D 25 HYDROXY: CPT

## 2024-10-18 PROCEDURE — 84443 ASSAY THYROID STIM HORMONE: CPT

## 2024-10-18 PROCEDURE — 3008F BODY MASS INDEX DOCD: CPT | Performed by: INTERNAL MEDICINE

## 2024-10-18 PROCEDURE — 82728 ASSAY OF FERRITIN: CPT

## 2024-10-18 PROCEDURE — 82607 VITAMIN B-12: CPT

## 2024-10-18 PROCEDURE — 99396 PREV VISIT EST AGE 40-64: CPT | Performed by: INTERNAL MEDICINE

## 2024-10-18 PROCEDURE — 36415 COLL VENOUS BLD VENIPUNCTURE: CPT

## 2024-10-18 PROCEDURE — 80053 COMPREHEN METABOLIC PANEL: CPT

## 2024-10-18 PROCEDURE — 83550 IRON BINDING TEST: CPT

## 2024-10-18 PROCEDURE — 83540 ASSAY OF IRON: CPT

## 2024-10-18 RX ORDER — FLUTICASONE PROPIONATE 93 UG/1
SPRAY, METERED NASAL
COMMUNITY
Start: 2023-12-15

## 2024-10-18 RX ORDER — OLOPATADINE HYDROCHLORIDE 665 UG/1
SPRAY NASAL
COMMUNITY
Start: 2022-11-03

## 2024-10-18 RX ORDER — IPRATROPIUM BROMIDE 21 UG/1
2 SPRAY, METERED NASAL 2 TIMES DAILY
COMMUNITY
Start: 2023-11-17

## 2024-10-18 ASSESSMENT — ENCOUNTER SYMPTOMS
WEAKNESS: 0
NECK PAIN: 0
FREQUENCY: 0
LIGHT-HEADEDNESS: 0
DYSPHORIC MOOD: 0
HEADACHES: 0
SHORTNESS OF BREATH: 0
BRUISES/BLEEDS EASILY: 0
DIZZINESS: 0
EYE ITCHING: 0
RHINORRHEA: 0
JOINT SWELLING: 0
SINUS PAIN: 0
BACK PAIN: 0
WHEEZING: 0
COLOR CHANGE: 0
VOICE CHANGE: 0
SINUS PRESSURE: 0
SLEEP DISTURBANCE: 0
NAUSEA: 0
CONSTIPATION: 0
ABDOMINAL DISTENTION: 0
ADENOPATHY: 0
NUMBNESS: 0
COUGH: 0
HYPERACTIVE: 0
PALPITATIONS: 0
ABDOMINAL PAIN: 0
DYSURIA: 0
EYE DISCHARGE: 0
TROUBLE SWALLOWING: 0
SORE THROAT: 0
FEVER: 0
VOMITING: 0
ARTHRALGIAS: 1
ACTIVITY CHANGE: 0
FATIGUE: 0
CHILLS: 0
NERVOUS/ANXIOUS: 1
DECREASED CONCENTRATION: 0
NECK STIFFNESS: 0
MYALGIAS: 0
HEMATURIA: 0
DIARRHEA: 0
CHEST TIGHTNESS: 0

## 2024-10-18 NOTE — PATIENT INSTRUCTIONS
We will follow up on all comprehensive blood work once results are available and make any recommendations based on these results as may be indicated.  Please continue with routine gynecologic exams and mammograms.  Someone should be contacting you shortly to help schedule a colonoscopy.  Thank you for keeping up with routine vaccines.  Please keep close follow-up with your specialists and contact us if you have any questions or need any additional refills.  Otherwise, we are happy to see you annually for your wellness visits.

## 2024-10-18 NOTE — PROGRESS NOTES
"Ofelia Wiggins \"Elmira\" comes in today for a comprehensive physical exam.      Ms. Wiggins comes in today for a comprehensive physical exam.  She is feeling reasonably well.  She has started back on allergy injections, this has helped her breathing be better managed, rarely needing her maintenance inhaler.  She continues to struggle with significant anxiety, taking clonidine at night.  She continues on her PPI therapy, still has some IBS symptoms, but otherwise reasonably well-managed.  She follows with gynecology as well as a high risk breast specialist.  She does keep up with routine vaccines.  She is amenable to updating lab studies.  She had a fall at work, slipped on something a student had spilled, fell onto her knees.  She was diagnosed with a bony contusion and is going through physical therapy.  This is slowly improving.  She denies any significant headaches, chest pain, palpitations, cough, nausea, vomiting, nor changes in bowel nor bladder habits.        Review of Systems   Constitutional:  Negative for activity change, chills, fatigue and fever.   HENT:  Positive for congestion. Negative for ear pain, postnasal drip, rhinorrhea, sinus pressure, sinus pain, sneezing, sore throat, tinnitus, trouble swallowing and voice change.    Eyes:  Negative for discharge, itching and visual disturbance.   Respiratory:  Negative for cough, chest tightness, shortness of breath and wheezing.    Cardiovascular:  Negative for chest pain, palpitations and leg swelling.   Gastrointestinal:  Negative for abdominal distention, abdominal pain, constipation, diarrhea, nausea and vomiting.   Endocrine: Negative for cold intolerance, heat intolerance and polyuria.   Genitourinary:  Negative for dysuria, frequency, hematuria, urgency, vaginal bleeding and vaginal discharge.   Musculoskeletal:  Positive for arthralgias. Negative for back pain, joint swelling, myalgias, neck pain and neck stiffness.   Skin:  Negative for color change, " pallor and rash.   Allergic/Immunologic: Positive for environmental allergies. Negative for immunocompromised state.   Neurological:  Negative for dizziness, syncope, weakness, light-headedness, numbness and headaches.   Hematological:  Negative for adenopathy. Does not bruise/bleed easily.   Psychiatric/Behavioral:  Negative for behavioral problems, decreased concentration, dysphoric mood and sleep disturbance. The patient is nervous/anxious. The patient is not hyperactive.        Objective   Physical Exam  Constitutional:       General: She is not in acute distress.     Appearance: Normal appearance. She is well-developed. She is not ill-appearing.   HENT:      Head: Normocephalic.      Right Ear: Tympanic membrane, ear canal and external ear normal.      Left Ear: Tympanic membrane, ear canal and external ear normal.      Nose: Nose normal. No congestion.      Mouth/Throat:      Mouth: Mucous membranes are moist.      Pharynx: Oropharynx is clear. No oropharyngeal exudate or posterior oropharyngeal erythema.   Eyes:      General: Lids are normal. No scleral icterus.     Extraocular Movements: Extraocular movements intact.      Conjunctiva/sclera: Conjunctivae normal.      Pupils: Pupils are equal, round, and reactive to light.   Neck:      Vascular: No carotid bruit.   Cardiovascular:      Rate and Rhythm: Normal rate and regular rhythm.      Pulses: Normal pulses.      Heart sounds: No murmur heard.     No gallop.   Pulmonary:      Effort: Pulmonary effort is normal. No respiratory distress.      Breath sounds: No wheezing, rhonchi or rales.   Chest:      Comments: Deferred to gyn  Abdominal:      General: Bowel sounds are normal. There is no distension.      Palpations: Abdomen is soft. There is no mass.      Tenderness: There is no abdominal tenderness. There is no guarding or rebound.   Genitourinary:     Comments: Deferred to gyn  Musculoskeletal:         General: No swelling or signs of injury.       Cervical back: Normal range of motion and neck supple. No tenderness.      Right lower leg: No edema.      Left lower leg: No edema.   Lymphadenopathy:      Cervical: No cervical adenopathy.   Skin:     General: Skin is warm and dry.      Coloration: Skin is not pale.      Findings: No bruising or rash.   Neurological:      General: No focal deficit present.      Mental Status: She is alert and oriented to person, place, and time.      Cranial Nerves: No cranial nerve deficit.      Motor: No weakness.      Coordination: Coordination normal.      Gait: Gait normal.   Psychiatric:         Mood and Affect: Mood normal.         Behavior: Behavior normal.         Judgment: Judgment normal.         Assessment/Plan   Full age-appropriate comprehensive physical exam and health care maintenance performed and discussed today.  Routine safety and preventative measures discussed including self breast exam, seatbelt use, no drinking and driving, no texting and driving, abstinence or cessation of tobacco use, routine dental and vision exams, healthy diet and regular exercise.    We will update comprehensive labs and follow-up on results once available.  Mammograms followed closely by high risk breast specialist, every 6 months along with fast MRI.  EKG as above.  Due for baseline colonoscopy.  Order placed.  DEXA screening start age-appropriate times in the future.  Tetanus up-to-date from 2016.  Has recently obtained flu shot and COVID booster.    Happy to see her back annually.  She is to contact us with any questions in the interim.  Problem List Items Addressed This Visit    None

## 2024-10-23 DIAGNOSIS — M25.561 ACUTE BILATERAL KNEE PAIN: ICD-10-CM

## 2024-10-23 DIAGNOSIS — M25.562 ACUTE BILATERAL KNEE PAIN: ICD-10-CM

## 2024-10-23 RX ORDER — DICLOFENAC SODIUM 10 MG/G
4 GEL TOPICAL 4 TIMES DAILY
Qty: 100 G | Refills: 1 | Status: SHIPPED | OUTPATIENT
Start: 2024-10-23 | End: 2024-10-23 | Stop reason: SDUPTHER

## 2024-10-23 RX ORDER — DICLOFENAC SODIUM 10 MG/G
4 GEL TOPICAL 4 TIMES DAILY
Qty: 100 G | Refills: 1 | Status: SHIPPED | OUTPATIENT
Start: 2024-10-23

## 2024-10-24 DIAGNOSIS — K21.9 GASTROESOPHAGEAL REFLUX DISEASE WITHOUT ESOPHAGITIS: Primary | ICD-10-CM

## 2024-10-24 RX ORDER — LANSOPRAZOLE 30 MG/1
30 CAPSULE, DELAYED RELEASE ORAL
Qty: 90 CAPSULE | Refills: 3 | Status: SHIPPED | OUTPATIENT
Start: 2024-10-24

## 2024-10-28 DIAGNOSIS — Z12.11 COLON CANCER SCREENING: ICD-10-CM

## 2024-10-28 RX ORDER — SOD SULF/POT CHLORIDE/MAG SULF 1.479 G
TABLET ORAL
Qty: 24 TABLET | Refills: 0 | Status: SHIPPED | OUTPATIENT
Start: 2024-10-28

## 2024-10-29 DIAGNOSIS — Z12.11 COLON CANCER SCREENING: ICD-10-CM

## 2024-10-29 RX ORDER — SODIUM PICOSULFATE, MAGNESIUM OXIDE, AND ANHYDROUS CITRIC ACID 12; 3.5; 1 G/175ML; G/175ML; MG/175ML
1 LIQUID ORAL SEE ADMIN INSTRUCTIONS
Qty: 350 ML | Refills: 0 | Status: SHIPPED | OUTPATIENT
Start: 2024-10-29

## 2024-11-15 ENCOUNTER — APPOINTMENT (OUTPATIENT)
Dept: PHYSICAL THERAPY | Facility: CLINIC | Age: 44
End: 2024-11-15
Payer: COMMERCIAL

## 2024-11-15 DIAGNOSIS — S80.01XA CONTUSION OF RIGHT KNEE: ICD-10-CM

## 2024-11-15 DIAGNOSIS — S80.02XD CONTUSION OF LEFT KNEE, SUBSEQUENT ENCOUNTER: ICD-10-CM

## 2024-11-15 DIAGNOSIS — S80.02XA CONTUSION OF LEFT KNEE: ICD-10-CM

## 2024-11-15 DIAGNOSIS — S80.01XD CONTUSION OF RIGHT KNEE, SUBSEQUENT ENCOUNTER: Primary | ICD-10-CM

## 2024-11-20 ENCOUNTER — APPOINTMENT (OUTPATIENT)
Dept: PHYSICAL THERAPY | Facility: CLINIC | Age: 44
End: 2024-11-20
Payer: COMMERCIAL

## 2024-11-20 ENCOUNTER — OFFICE VISIT (OUTPATIENT)
Dept: URGENT CARE | Age: 44
End: 2024-11-20
Payer: COMMERCIAL

## 2024-11-20 VITALS
BODY MASS INDEX: 20.89 KG/M2 | SYSTOLIC BLOOD PRESSURE: 143 MMHG | RESPIRATION RATE: 18 BRPM | OXYGEN SATURATION: 100 % | TEMPERATURE: 98.7 F | HEART RATE: 83 BPM | WEIGHT: 130 LBS | HEIGHT: 66 IN | DIASTOLIC BLOOD PRESSURE: 98 MMHG

## 2024-11-20 DIAGNOSIS — S80.02XA CONTUSION OF LEFT KNEE: ICD-10-CM

## 2024-11-20 DIAGNOSIS — S80.01XA CONTUSION OF RIGHT KNEE: ICD-10-CM

## 2024-11-20 DIAGNOSIS — S80.02XD CONTUSION OF LEFT KNEE, SUBSEQUENT ENCOUNTER: ICD-10-CM

## 2024-11-20 DIAGNOSIS — S80.01XD CONTUSION OF RIGHT KNEE, SUBSEQUENT ENCOUNTER: Primary | ICD-10-CM

## 2024-11-20 DIAGNOSIS — J01.90 ACUTE SINUSITIS, RECURRENCE NOT SPECIFIED, UNSPECIFIED LOCATION: Primary | ICD-10-CM

## 2024-11-20 DIAGNOSIS — E61.1 IRON DEFICIENCY: Primary | ICD-10-CM

## 2024-11-20 PROCEDURE — 3008F BODY MASS INDEX DOCD: CPT | Performed by: PHYSICIAN ASSISTANT

## 2024-11-20 PROCEDURE — 99070 SPECIAL SUPPLIES PHYS/QHP: CPT | Performed by: PHYSICIAN ASSISTANT

## 2024-11-20 PROCEDURE — 99213 OFFICE O/P EST LOW 20 MIN: CPT | Performed by: PHYSICIAN ASSISTANT

## 2024-11-20 RX ORDER — AMOXICILLIN AND CLAVULANATE POTASSIUM 875; 125 MG/1; MG/1
875 TABLET, FILM COATED ORAL 2 TIMES DAILY
Qty: 20 TABLET | Refills: 0 | Status: SHIPPED | OUTPATIENT
Start: 2024-11-20 | End: 2024-11-30

## 2024-11-20 ASSESSMENT — ENCOUNTER SYMPTOMS
COUGH: 0
SHORTNESS OF BREATH: 0
RHINORRHEA: 1
SINUS PRESSURE: 1
FEVER: 0
SORE THROAT: 1
DIAPHORESIS: 0
CHILLS: 1

## 2024-11-20 NOTE — PROGRESS NOTES
"Subjective   Patient ID: Ofelia Wiggins \"Elaina" is a 44 y.o. female. They present today with a chief complaint of Sinusitis (Body aches, nasal burning, sinus pressure/pain, fatigue, itchy eyes ongoing for days).    History of Present Illness  Hx of severe allergies, has been worse last couple weeks.    Use nasal spray, gets allergy injections, montelukast, xyzal.    Nasal congestion/rhinorrhea/pressure/HA, body aches, chills, fatigue    Denies fever, sweats, sore throat, ear pain, chest pain, SOB, cough      Sinusitis  Associated symptoms: chills, congestion, rhinorrhea and sore throat    Associated symptoms: no chest pain, no cough, no ear pain, no fever and no shortness of breath        Past Medical History  Allergies as of 11/20/2024 - Reviewed 11/20/2024   Allergen Reaction Noted    Grass pollen Unknown 10/18/2024    Mold Unknown 10/18/2024    Sertraline Hallucinations and Other 11/26/2012    Weed pollen-short ragweed Unknown 10/18/2024    Sulfamethoxazole Rash 11/26/2012    Sulfamethoxazole-trimethoprim Rash 11/08/2017       (Not in a hospital admission)       Past Medical History:   Diagnosis Date    Alcohol use disorder, severe, dependence (Multi) 09/13/2023    Anxiety     Arthritis     Atypical ductal hyperplasia, breast     Cellulitis     Depression     Eczema     Erythema nodosum     GERD (gastroesophageal reflux disease)     Hidradenitis suppurativa 08/10/2023    Moderate cervical dysplasia     Cervical intraepithelial neoplasia grade 2    Moderate dysplasia of cervix (KERA II)     Sebaceous cyst 08/10/2023    TMJ inflammation 09/13/2023    Tubal ectopic pregnancy ([facility]) 09/13/2023    White coat syndrome with hypertension 09/13/2023       Past Surgical History:   Procedure Laterality Date    BREAST LUMPECTOMY      CERVICAL BIOPSY  W/ LOOP ELECTRODE EXCISION  06/04/2014    Cervical Loop Electrosurgical Excision (LEEP)    EYE SURGERY      STRABISMUS SURGERY  03/13/2014    Strabismus Surgery    WISDOM " "TOOTH EXTRACTION          reports that she has been smoking cigarettes. She has a 3.8 pack-year smoking history. She has never used smokeless tobacco. She reports current alcohol use. She reports that she does not use drugs.    Review of Systems  Review of Systems   Constitutional:  Positive for chills. Negative for diaphoresis and fever.   HENT:  Positive for congestion, rhinorrhea, sinus pressure and sore throat. Negative for ear pain.    Respiratory:  Negative for cough and shortness of breath.    Cardiovascular:  Negative for chest pain.   All other systems reviewed and are negative.                                 Objective    Vitals:    11/20/24 1246   BP: (!) 143/98   BP Location: Left arm   Patient Position: Sitting   BP Cuff Size: Small adult   Pulse: 83   Resp: 18   Temp: 37.1 °C (98.7 °F)   TempSrc: Oral   SpO2: 100%   Weight: 59 kg (130 lb)   Height: 1.676 m (5' 6\")     No LMP recorded.    Physical Exam  Vitals and nursing note reviewed.   Constitutional:       General: She is not in acute distress.  HENT:      Right Ear: Tympanic membrane normal.      Left Ear: Tympanic membrane normal.      Nose: Congestion and rhinorrhea present.      Mouth/Throat:      Pharynx: No oropharyngeal exudate or posterior oropharyngeal erythema.   Cardiovascular:      Rate and Rhythm: Normal rate and regular rhythm.      Heart sounds: Normal heart sounds.   Pulmonary:      Effort: Pulmonary effort is normal.      Breath sounds: Normal breath sounds.   Neurological:      Mental Status: She is alert.         Procedures    Point of Care Test & Imaging Results from this visit  No results found for this visit on 11/20/24.   No results found.    Diagnostic study results (if any) were reviewed by Arti Rodriguez PA-C.    Assessment/Plan   Allergies, medications, history, and pertinent labs/EKGs/Imaging reviewed by Arti Rodriguez PA-C.       Orders and Diagnoses  There are no diagnoses linked to this encounter.    Medical Admin " Record      Patient disposition: Home    Electronically signed by Arti Rodriguez PA-C  1:03 PM

## 2024-11-25 DIAGNOSIS — F41.9 ANXIETY: ICD-10-CM

## 2024-11-25 RX ORDER — CLONIDINE HYDROCHLORIDE 0.1 MG/1
0.1 TABLET ORAL 2 TIMES DAILY
Qty: 180 TABLET | Refills: 1 | Status: SHIPPED | OUTPATIENT
Start: 2024-11-25

## 2024-12-03 DIAGNOSIS — N76.0 ACUTE VAGINITIS: Primary | ICD-10-CM

## 2024-12-03 RX ORDER — FLUCONAZOLE 150 MG/1
150 TABLET ORAL ONCE
Qty: 2 TABLET | Refills: 0 | Status: SHIPPED | OUTPATIENT
Start: 2024-12-03 | End: 2024-12-03

## 2024-12-04 ENCOUNTER — APPOINTMENT (OUTPATIENT)
Dept: DERMATOLOGY | Facility: CLINIC | Age: 44
End: 2024-12-04
Payer: COMMERCIAL

## 2024-12-04 DIAGNOSIS — L71.9 ROSACEA: Primary | ICD-10-CM

## 2024-12-04 DIAGNOSIS — L98.8 RHYTIDES: ICD-10-CM

## 2024-12-04 PROCEDURE — 99214 OFFICE O/P EST MOD 30 MIN: CPT | Performed by: DERMATOLOGY

## 2024-12-04 RX ORDER — IVERMECTIN 10 MG/G
1 CREAM TOPICAL DAILY
Qty: 30 G | Refills: 3 | Status: SHIPPED | OUTPATIENT
Start: 2024-12-04

## 2024-12-04 ASSESSMENT — DERMATOLOGY QUALITY OF LIFE (QOL) ASSESSMENT
RATE HOW EMOTIONALLY BOTHERED YOU ARE BY YOUR SKIN PROBLEM (FOR EXAMPLE, WORRY, EMBARRASSMENT, FRUSTRATION): 2
RATE HOW BOTHERED YOU ARE BY EFFECTS OF YOUR SKIN PROBLEMS ON YOUR ACTIVITIES (EG, GOING OUT, ACCOMPLISHING WHAT YOU WANT, WORK ACTIVITIES OR YOUR RELATIONSHIPS WITH OTHERS): 1
RATE HOW BOTHERED YOU ARE BY EFFECTS OF YOUR SKIN PROBLEMS ON YOUR ACTIVITIES (EG, GOING OUT, ACCOMPLISHING WHAT YOU WANT, WORK ACTIVITIES OR YOUR RELATIONSHIPS WITH OTHERS): 1
WHAT SINGLE SKIN CONDITION LISTED BELOW IS THE PATIENT ANSWERING THE QUALITY-OF-LIFE ASSESSMENT QUESTIONS ABOUT: ROSACEA
RATE HOW EMOTIONALLY BOTHERED YOU ARE BY YOUR SKIN PROBLEM (FOR EXAMPLE, WORRY, EMBARRASSMENT, FRUSTRATION): 2
WHAT SINGLE SKIN CONDITION LISTED BELOW IS THE PATIENT ANSWERING THE QUALITY-OF-LIFE ASSESSMENT QUESTIONS ABOUT: ROSACEA
RATE HOW BOTHERED YOU ARE BY SYMPTOMS OF YOUR SKIN PROBLEM (EG, ITCHING, STINGING BURNING, HURTING OR SKIN IRRITATION): 2
RATE HOW BOTHERED YOU ARE BY SYMPTOMS OF YOUR SKIN PROBLEM (EG, ITCHING, STINGING BURNING, HURTING OR SKIN IRRITATION): 2

## 2024-12-04 ASSESSMENT — PATIENT GLOBAL ASSESSMENT (PGA): WHAT IS THE PGA: PATIENT GLOBAL ASSESSMENT:  2 - MILD

## 2024-12-04 NOTE — PROGRESS NOTES
Subjective     Elmira Wiggins is a 44 y.o. female who presents for the following: Rosacea. Last derm visit 9/17/24 for Full Skin Exam and rosacea.     Review of Systems:  No other skin or systemic complaints other than what is documented elsewhere in the note.    The following portions of the chart were reviewed this encounter and updated as appropriate:   Tobacco  Allergies  Meds  Problems  Med Hx  Surg Hx  Fam Hx         Skin Cancer History  No skin cancer on file.      Specialty Problems          Dermatology Problems    Contusion of left knee    Contusion of right knee        Objective   Well appearing patient in no apparent distress; mood and affect are within normal limits.    A focused skin examination was performed. All findings within normal limits unless otherwise noted below.    Assessment/Plan   1. Rosacea  Head - Anterior (Face)  Limited exam on virtual visit, wearing make-up   She reports the redness is the same but dryness is better    -She would like improvement on redness  - allergic to sulfa    - Rx ivermectin / oxymetazoline through [facility]      ivermectin 1 % cream - Head - Anterior (Face)  Apply 1 Application topically once daily.    Related Procedures  Follow Up In Dermatology - Established Patient  Follow Up In Dermatology - Established Patient    Related Medications  metroNIDAZOLE (Metrocream) 0.75 % cream  Apply 1 Application topically 2 times a day. To face for 2-3 months and then continue or use as needed    2. Rhytides  Head - Anterior (Face)  Wrinkles or fine lines.    Neutrogena Hydroboost line and Neutrogena Collagen bank are gentle skin care products that can help with wrinkles        Follow up 3 months in person for rosacea. She had too much trouble with virtual visit

## 2024-12-04 NOTE — PATIENT INSTRUCTIONS
Neutrogena Hydroboost line and Neutrogena Collagen bank are gentle skin care products that can help with wrinkles

## 2024-12-05 ENCOUNTER — TELEPHONE (OUTPATIENT)
Dept: DERMATOLOGY | Facility: CLINIC | Age: 44
End: 2024-12-05
Payer: COMMERCIAL

## 2024-12-10 ENCOUNTER — APPOINTMENT (OUTPATIENT)
Dept: PHYSICAL THERAPY | Facility: CLINIC | Age: 44
End: 2024-12-10
Payer: COMMERCIAL

## 2024-12-10 DIAGNOSIS — S80.01XA CONTUSION OF RIGHT KNEE: ICD-10-CM

## 2024-12-10 DIAGNOSIS — S80.02XA CONTUSION OF LEFT KNEE: ICD-10-CM

## 2024-12-10 DIAGNOSIS — S80.02XD CONTUSION OF LEFT KNEE, SUBSEQUENT ENCOUNTER: ICD-10-CM

## 2024-12-10 DIAGNOSIS — S80.01XD CONTUSION OF RIGHT KNEE, SUBSEQUENT ENCOUNTER: Primary | ICD-10-CM

## 2024-12-11 ENCOUNTER — TREATMENT (OUTPATIENT)
Dept: PHYSICAL THERAPY | Facility: CLINIC | Age: 44
End: 2024-12-11
Payer: COMMERCIAL

## 2024-12-11 DIAGNOSIS — S80.02XD CONTUSION OF LEFT KNEE, SUBSEQUENT ENCOUNTER: ICD-10-CM

## 2024-12-11 DIAGNOSIS — S80.01XD CONTUSION OF RIGHT KNEE, SUBSEQUENT ENCOUNTER: Primary | ICD-10-CM

## 2024-12-11 DIAGNOSIS — S80.02XA CONTUSION OF LEFT KNEE, INITIAL ENCOUNTER: ICD-10-CM

## 2024-12-11 DIAGNOSIS — S80.01XA CONTUSION OF RIGHT KNEE: ICD-10-CM

## 2024-12-11 DIAGNOSIS — S80.02XA CONTUSION OF LEFT KNEE: ICD-10-CM

## 2024-12-11 PROCEDURE — 97110 THERAPEUTIC EXERCISES: CPT | Mod: GP

## 2024-12-11 NOTE — PROGRESS NOTES
"  Physical Therapy    Physical Therapy Treatment    Patient Name: Ofelia Wiggins  MRN: [id number]  Today's Date: 12/11/2024  Time Calculation  Start Time: 1650  Stop Time: 1730  Time Calculation (min): 40 min  PT Therapeutic Procedures Time Entry  Therapeutic Exercise Time Entry: 40'        Insurance:  Visit number: 5 of 12  Authorization info: 7/1/24-12/21/2024  Insurance Type: Payor: [employer] UNIC[employer] / Plan: [employer] UNICOMP / Product Type: *No Product type* /     Current Problem  1. Contusion of right knee, subsequent encounter        2. Contusion of left knee, subsequent encounter        3. Contusion of left knee  Follow Up In Physical Therapy      4. Contusion of right knee  Follow Up In Physical Therapy      5. Contusion of left knee, initial encounter [S80.02XA]              General    Precautions  Left hip pain    SUBJECTIVE:   Patient reports she is doing ok today. Does not have knee pain after performing her HEP. If she goes 2-3 days w/o exercise she will experience knee pain, but as long as she is consistent w/ her HEP she does not. Expresses some frustration w/ the lingering symptoms.     Imaging:   Xrays Right, Left Knee 06/2024  IMPRESSION: Normal radiographs of the knees     Pain:   0/10    OBJECTIVE:    Palpation: Not TTP B PT insertions    Hip AROM: > WNL all planes R/L   HEIDI: Negative  FADIR: Negative    Knee AROM: WFL R/L, +pain free at end range    Knee Strength:   Extension: R 5/5 L 5/5 mild \"discomfort\" reported but not familiar pain  SLR: able to perform 10+ reps B pain free w/o quad lag. Feels stronger than at time of her eval.   Mild weight shifting off of LLE onto RLE w/ sit to stands that can be corrected w/ verbal cues.      There-ex: 40'  Rec bike x 5 min   Eccentric LAQs w/ 5# w/ 3-4\" descent 3x8-10* per side  Yellow perform better TB TKE's 3x12 per side*  Standing hamstring curls w/ red TB 2x12 per side*  Standing hip extension w/ red TB 2x12 per side*  Reviewed, adjusted and " discussed HEP going forward      * = added to HEP.  Sheet with exercise descriptions and images issued.  Skilled intervention utilized in the appropriate selection & application of above exercises.  Verbal and tactile cues provided for proper form and technique.  Pt. demonstrated appropriate form & verbalized understanding of optimal technique for above exercises.    [facility].[employer]    Access Code: [email or web address]      ASSESSMENT:   Patient tolerated today's session w/ expected muscle fatigue. Demonstrates improvements in activity tolerance, as well as quad and posterior chain strength displayed through progression of there-ex w/o a production of familiar knee pain. Patient is slowly progressing and will benefit from ongoing PT services to continue to progress patellar tendon loading and posterior chain strengthening activities as tolerated to reach established goals to return to PLOF.        PLAN:  Trial of step downs, standing hip abduction, prone hamstring curls, heel raises, and banded hip extension for strengthening progression.          Goals:   Pt will report pain as 0/10 in both knees at rest to indicate healing and rest: MET  Pt will demonstrate strong and painfree resisted knee ext to demonstrate improved strength of the patellar tendon: MET  Pt will report she is able to walk 60+ minutes without onset of bilateral knee pain to resume prior level of function: Progressing  Pt will demonstrate compliance with HEP to be discharged to HEP: MET

## 2024-12-17 ENCOUNTER — APPOINTMENT (OUTPATIENT)
Dept: PHYSICAL THERAPY | Facility: CLINIC | Age: 44
End: 2024-12-17
Payer: COMMERCIAL

## 2024-12-17 DIAGNOSIS — S80.02XA CONTUSION OF LEFT KNEE: ICD-10-CM

## 2024-12-17 DIAGNOSIS — S80.01XA CONTUSION OF RIGHT KNEE: ICD-10-CM

## 2024-12-17 DIAGNOSIS — S80.02XD CONTUSION OF LEFT KNEE, SUBSEQUENT ENCOUNTER: ICD-10-CM

## 2024-12-17 DIAGNOSIS — S80.01XD CONTUSION OF RIGHT KNEE, SUBSEQUENT ENCOUNTER: Primary | ICD-10-CM

## 2024-12-18 ENCOUNTER — TREATMENT (OUTPATIENT)
Dept: PHYSICAL THERAPY | Facility: CLINIC | Age: 44
End: 2024-12-18
Payer: COMMERCIAL

## 2024-12-18 DIAGNOSIS — S80.02XA CONTUSION OF LEFT KNEE: ICD-10-CM

## 2024-12-18 DIAGNOSIS — S80.02XA CONTUSION OF LEFT KNEE, INITIAL ENCOUNTER: ICD-10-CM

## 2024-12-18 DIAGNOSIS — S80.01XD CONTUSION OF RIGHT KNEE, SUBSEQUENT ENCOUNTER: Primary | ICD-10-CM

## 2024-12-18 DIAGNOSIS — S80.02XD CONTUSION OF LEFT KNEE, SUBSEQUENT ENCOUNTER: ICD-10-CM

## 2024-12-18 DIAGNOSIS — S80.01XA CONTUSION OF RIGHT KNEE, INITIAL ENCOUNTER: ICD-10-CM

## 2024-12-18 DIAGNOSIS — S80.01XA CONTUSION OF RIGHT KNEE: ICD-10-CM

## 2024-12-18 PROCEDURE — 97110 THERAPEUTIC EXERCISES: CPT | Mod: GP

## 2024-12-18 NOTE — PROGRESS NOTES
"  Physical Therapy    Physical Therapy Treatment    Patient Name: Ofelia Wiggins  MRN: [id number]  Today's Date: 12/18/2024  Time Calculation  Start Time: 1645  Stop Time: 1725  Time Calculation (min): 40 min  PT Therapeutic Procedures Time Entry  Therapeutic Exercise Time Entry: 40'        Insurance:  Visit number: 6 of 12  Authorization info: 7/1/24-12/21/2024  Insurance Type: Payor: [employer] UNICOMP / Plan: [employer] UNICOMP / Product Type: *No Product type* /     Current Problem  1. Contusion of right knee, subsequent encounter        2. Contusion of left knee, subsequent encounter        3. Contusion of left knee  Follow Up In Physical Therapy      4. Contusion of right knee  Follow Up In Physical Therapy      5. Contusion of left knee, initial encounter [S80.02XA]        6. Contusion of right knee, initial encounter [S80.01XA]              General    Precautions  Left hip pain    SUBJECTIVE:   Patient reports she is doing well overall. New HEP feels purposeful and works appropriate areas. Feels sore after performance but not familiar pain.    Imaging:   Xrays Right, Left Knee 06/2024  IMPRESSION: Normal radiographs of the knees     Pain:   0/10    OBJECTIVE:    Palpation: Not TTP B PT insertions    Hip AROM: > WNL all planes R/L   HEIDI: Negative  FADIR: Negative    Knee AROM: WFL R/L, +pain free at end range    Knee Strength:   Extension: R 5/5 L 5/5 mild \"discomfort\" reported but not familiar pain  SLR: able to perform 10+ reps B pain free w/o quad lag. Feels stronger than at time of her eval.   Mild weight shifting off of LLE onto RLE w/ sit to stands that can be corrected w/ verbal cues.      There-ex: 40'  Rec bike x 5 min   Prone hamstring curls w/ 5# ankle weights 2x12 per side  Standing hip abduction w/ 5# ankle weight off 2\" wedge 3x12 per side*  Step downs from 2\" step 3x12 per side  Heel raises off 2\" step going below parallel 2x20*  Reverse clamshells w/ red TB and adductor pillow 2x8-10 per " side*    * = added to HEP.  Sheet with exercise descriptions and images issued.  Skilled intervention utilized in the appropriate selection & application of above exercises.  Verbal and tactile cues provided for proper form and technique.  Pt. demonstrated appropriate form & verbalized understanding of optimal technique for above exercises.    [facility].[employer]    Access Code: [email or web address]      ASSESSMENT:   Patient tolerated today's session w/ expected muscle fatigue. Demonstrates improvements in activity tolerance and overall LE strength displayed through improved performance of there-ex w/ increased resistance. Patient had some difficulty w/ prone hamstring curls due to non-painful crepitus in B knee joints which did not limit performance of the exercise. Patient is continuing to progress slowly and will benefit from ongoing PT services to continue to progress LE strengthening and stability activities as tolerated to reach established goals to return to PLOF.        PLAN:  Trial of goblet mini squats, runner's step ups, lateral step downs and standing clamshells for strengthening progression.          Goals:   Pt will report pain as 0/10 in both knees at rest to indicate healing and rest: MET  Pt will demonstrate strong and painfree resisted knee ext to demonstrate improved strength of the patellar tendon: MET  Pt will report she is able to walk 60+ minutes without onset of bilateral knee pain to resume prior level of function: Progressing  Pt will demonstrate compliance with HEP to be discharged to HEP: MET

## 2024-12-21 DIAGNOSIS — Z12.11 COLON CANCER SCREENING: ICD-10-CM

## 2024-12-23 ENCOUNTER — APPOINTMENT (OUTPATIENT)
Dept: PHYSICAL THERAPY | Facility: CLINIC | Age: 44
End: 2024-12-23
Payer: COMMERCIAL

## 2024-12-23 DIAGNOSIS — S80.02XD CONTUSION OF LEFT KNEE, SUBSEQUENT ENCOUNTER: ICD-10-CM

## 2024-12-23 DIAGNOSIS — S80.01XA CONTUSION OF RIGHT KNEE: ICD-10-CM

## 2024-12-23 DIAGNOSIS — S80.02XA CONTUSION OF LEFT KNEE: ICD-10-CM

## 2024-12-23 DIAGNOSIS — S80.01XD CONTUSION OF RIGHT KNEE, SUBSEQUENT ENCOUNTER: Primary | ICD-10-CM

## 2024-12-23 RX ORDER — SODIUM, POTASSIUM,MAG SULFATES 17.5-3.13G
SOLUTION, RECONSTITUTED, ORAL ORAL
Refills: 0 | OUTPATIENT
Start: 2024-12-23

## 2024-12-30 ENCOUNTER — APPOINTMENT (OUTPATIENT)
Dept: PHYSICAL THERAPY | Facility: CLINIC | Age: 44
End: 2024-12-30
Payer: COMMERCIAL

## 2024-12-30 ENCOUNTER — OFFICE VISIT (OUTPATIENT)
Dept: URGENT CARE | Age: 44
End: 2024-12-30
Payer: COMMERCIAL

## 2024-12-30 VITALS
OXYGEN SATURATION: 100 % | HEART RATE: 81 BPM | WEIGHT: 130 LBS | DIASTOLIC BLOOD PRESSURE: 97 MMHG | HEIGHT: 66 IN | SYSTOLIC BLOOD PRESSURE: 159 MMHG | RESPIRATION RATE: 17 BRPM | TEMPERATURE: 97.7 F | BODY MASS INDEX: 20.89 KG/M2

## 2024-12-30 DIAGNOSIS — R05.1 ACUTE COUGH: ICD-10-CM

## 2024-12-30 DIAGNOSIS — S80.01XD CONTUSION OF RIGHT KNEE, SUBSEQUENT ENCOUNTER: Primary | ICD-10-CM

## 2024-12-30 DIAGNOSIS — J01.90 ACUTE NON-RECURRENT SINUSITIS, UNSPECIFIED LOCATION: Primary | ICD-10-CM

## 2024-12-30 DIAGNOSIS — S80.02XA CONTUSION OF LEFT KNEE: ICD-10-CM

## 2024-12-30 DIAGNOSIS — S80.01XA CONTUSION OF RIGHT KNEE: ICD-10-CM

## 2024-12-30 DIAGNOSIS — S80.02XD CONTUSION OF LEFT KNEE, SUBSEQUENT ENCOUNTER: ICD-10-CM

## 2024-12-30 PROCEDURE — 3008F BODY MASS INDEX DOCD: CPT | Performed by: PHYSICIAN ASSISTANT

## 2024-12-30 PROCEDURE — 99213 OFFICE O/P EST LOW 20 MIN: CPT | Performed by: PHYSICIAN ASSISTANT

## 2024-12-30 RX ORDER — AMOXICILLIN AND CLAVULANATE POTASSIUM 875; 125 MG/1; MG/1
875 TABLET, FILM COATED ORAL 2 TIMES DAILY
Qty: 20 TABLET | Refills: 0 | Status: SHIPPED | OUTPATIENT
Start: 2024-12-30 | End: 2025-01-09

## 2024-12-30 ASSESSMENT — ENCOUNTER SYMPTOMS
FEVER: 0
RHINORRHEA: 1
CHILLS: 0
SINUS PRESSURE: 1
SHORTNESS OF BREATH: 0
SINUS COMPLAINT: 1
DIAPHORESIS: 0
COUGH: 1
SORE THROAT: 0

## 2024-12-30 NOTE — PROGRESS NOTES
"Subjective   Patient ID: Ofelia Wiggins \"Elaina" is a 44 y.o. female. They present today with a chief complaint of Sinus Problem (Sinus infection congestion body aches 5 days).    History of Present Illness  Nasal congestion/rhinorrhea/pressure, body aches, productive cough (worse am) x 5 days.     Denies fever, chills, sweats, chest pain, SOB, sore throat, ear pain.    LMP, 3 wks          Sinus Problem  Associated symptoms: congestion, cough and rhinorrhea    Associated symptoms: no chest pain, no ear pain, no fever, no shortness of breath and no sore throat        Past Medical History  Allergies as of 12/30/2024 - Reviewed 12/30/2024   Allergen Reaction Noted    Grass pollen Unknown 10/18/2024    Mold Unknown 10/18/2024    Sertraline Hallucinations and Other 11/26/2012    Weed pollen-short ragweed Unknown 10/18/2024    Sulfamethoxazole Rash 11/26/2012    Sulfamethoxazole-trimethoprim Rash 11/08/2017       (Not in a hospital admission)       Past Medical History:   Diagnosis Date    Alcohol use disorder, severe, dependence (Multi) 09/13/2023    Anxiety     Arthritis     Atypical ductal hyperplasia, breast     Cellulitis     Depression     Eczema     Erythema nodosum     GERD (gastroesophageal reflux disease)     Hidradenitis suppurativa 08/10/2023    Moderate cervical dysplasia     Cervical intraepithelial neoplasia grade 2    Moderate dysplasia of cervix (KERA II)     Sebaceous cyst 08/10/2023    TMJ inflammation 09/13/2023    Tubal ectopic pregnancy ([facility]) 09/13/2023    White coat syndrome with hypertension 09/13/2023       Past Surgical History:   Procedure Laterality Date    BREAST LUMPECTOMY      CERVICAL BIOPSY  W/ LOOP ELECTRODE EXCISION  06/04/2014    Cervical Loop Electrosurgical Excision (LEEP)    EYE SURGERY      STRABISMUS SURGERY  03/13/2014    Strabismus Surgery    WISDOM TOOTH EXTRACTION          reports that she has been smoking cigarettes. She has a 3.8 pack-year smoking history. She has never " "used smokeless tobacco. She reports current alcohol use. She reports that she does not use drugs.    Review of Systems  Review of Systems   Constitutional:  Negative for chills, diaphoresis and fever.   HENT:  Positive for congestion, rhinorrhea and sinus pressure. Negative for ear pain and sore throat.    Respiratory:  Positive for cough. Negative for shortness of breath.    Cardiovascular:  Negative for chest pain.   All other systems reviewed and are negative.                                 Objective    Vitals:    12/30/24 1405   BP: (!) 159/97   BP Location: Left arm   Patient Position: Sitting   BP Cuff Size: Small adult   Pulse: 81   Resp: 17   Temp: 36.5 °C (97.7 °F)   TempSrc: Oral   SpO2: 100%   Weight: 59 kg (130 lb)   Height: 1.676 m (5' 6\")     No LMP recorded.    Physical Exam  Vitals and nursing note reviewed.   Constitutional:       General: She is not in acute distress.  HENT:      Right Ear: Tympanic membrane normal.      Left Ear: Tympanic membrane normal.      Nose: Congestion and rhinorrhea present.      Mouth/Throat:      Pharynx: No oropharyngeal exudate or posterior oropharyngeal erythema.   Cardiovascular:      Rate and Rhythm: Normal rate and regular rhythm.      Heart sounds: Normal heart sounds.   Pulmonary:      Effort: Pulmonary effort is normal.      Breath sounds: Normal breath sounds.   Neurological:      Mental Status: She is alert.         Procedures    Point of Care Test & Imaging Results from this visit  No results found for this visit on 12/30/24.   No results found.    Diagnostic study results (if any) were reviewed by Arti Rodriguez PA-C.    Assessment/Plan   Allergies, medications, history, and pertinent labs/EKGs/Imaging reviewed by Arti Rodriguez PA-C.     Orders and Diagnoses  There are no diagnoses linked to this encounter.    Medical Admin Record      Patient disposition: Home    Electronically signed by Arti Rodriguez PA-C  2:13 PM      "

## 2025-01-16 ENCOUNTER — APPOINTMENT (OUTPATIENT)
Dept: SURGICAL ONCOLOGY | Facility: CLINIC | Age: 45
End: 2025-01-16
Payer: COMMERCIAL

## 2025-01-16 ENCOUNTER — APPOINTMENT (OUTPATIENT)
Dept: RADIOLOGY | Facility: CLINIC | Age: 45
End: 2025-01-16
Payer: COMMERCIAL

## 2025-01-30 NOTE — PROGRESS NOTES
Ofelia Wiggins female   1980 44 y.o.   [id number]      Chief Complaint  High risk surveillance care, annual mammogram and exam    History Of Present Illness  Elmira Wiggins is a very pleasant 44 year old  woman [facility]. 2021 she underwent right breast core biopsy demonstrated atypical ductal hyperplasia (ADH) and flat epithelial atypia (FEA). 2021 Johnny Martinez performed right breast excisional biopsy. Final pathology revealed right breast focal residual FEA, stromal fibrosis, microcysts and other benign findings. She has family history of breast cancer in her mother, age 44, BRCA negative, paternal aunt and paternal grandmother. She presents today for annual mammogram and exam. She denies any new masses or lumps. She is here with her mother, Daphne.      BREAST IMAGIN2024 Bilateral Fast MRI, indicates BI-RADS Category 1. 2023 Bilateral screening mammogram, indicates BI-RADS Category 2.      FEMALE HISTORY: menarche age 11, , OCP's x 17 years, premenopausal, LMP 2025, regular monthly cycles, no contraception, does not desire future pregnancies, heterogeneously dense tissue     FAMILY CANCER HISTORY:  Mother: Breast cancer, 44 / Endometrial cancer, 52, BRCA negative  Paternal Aunt: Breast cancer  Paternal Grandmother: Breast cancer  Great Aunt: Ovarian cancer  Maternal Great Aunt: Colon cancer  Maternal Grandmother: Leukemia  Father: Prostate cancer         Surgical History  She has a past surgical history that includes Strabismus surgery (2014); Cervical biopsy w/ loop electrode excision (2014); Eye surgery; [place] tooth extraction; Breast lumpectomy; and Breast biopsy (?).     Social History  She reports that she has been smoking cigarettes. She has a 3.8 pack-year smoking history. She has never used smokeless tobacco. She reports current alcohol use. She reports that she does not use drugs.    Family  History  Family History   Problem Relation Name Age of Onset    Breast cancer Mother Katya     Miscarriages / Stillbirths Mother Katya     Asthma Mother Katya     Uterine cancer Mother Katya     Cancer Mother Katya     Prostate cancer Father      Breast cancer Father's Sister Jossy     Leukemia Maternal Grandmother Fiorella     Breast cancer Paternal Grandmother Kinga         Allergies  Grass pollen, Mold, Sertraline, Weed pollen-short ragweed, Sulfamethoxazole, and Sulfamethoxazole-trimethoprim    Medications  Current Outpatient Medications   Medication Instructions    albuterol 90 mcg/actuation inhaler 2 puffs, Every 4 hours PRN    azelastine (Astelin) 137 mcg (0.1 %) nasal spray 1 spray, 2 times daily    cloNIDine (CATAPRES) 0.1 mg, oral, 2 times daily    diclofenac sodium (VOLTAREN) 4 g, Topical, 4 times daily    EPINEPHrine (EPIPEN) 0.3 mg    flunisolide (Nasalide) 25 mcg (0.025 %) spray,non-aerosol USE 2 SPRAYS NASALLY TWICE DAILY.    ipratropium (Atrovent) 21 mcg (0.03 %) nasal spray 2 sprays, 2 times daily    ivermectin 1 % cream 1 Application, Topical, Daily    lansoprazole (PREVACID) 30 mg, oral, Daily before breakfast    levocetirizine (XYZAL) 5 mg, Daily RT    metroNIDAZOLE (Metrocream) 0.75 % cream 1 Application, Topical, 2 times daily, To face for 2-3 months and then continue or use as needed    montelukast (Singulair) 10 mg tablet 1 tablet, Nightly    sod picosulf-mag ox-citric ac (Clenpiq) 10 mg-3.5 gram- 12 gram/175 mL solution 1 Box, oral, See admin instructions, Starting at 6pm night before procedure drink 1 bottle as per instructions, then 5 hours before procedure time drink 2nd as instructed    sod sulf-pot chloride-mag sulf (Sutab) 1.479-0.188- 0.225 gram tablet Starting at 6pm open one bottle of pills and fill glass provided with water and drink according to prep sheet. Start the 2nd bottle with directions on prep sheet 5 hours before procedure time    triamcinolone (Kenalog) 0.1 % cream  Topical, 2 times daily, To affected areas of hands for up to 2 weeks.         REVIEW OF SYSTEMS    Constitutional:  Negative for appetite change, fatigue, fever and unexpected weight change.   HENT:  Negative for ear pain, hearing loss, nosebleeds, sore throat and trouble swallowing.    Eyes:  Negative for discharge, itching and visual disturbance.   Respiratory:  Negative for cough, chest tightness and shortness of breath.    Cardiovascular:  Negative for chest pain, palpitations and leg swelling.   Breast: as indicated in HPI  Gastrointestinal:  Negative for abdominal pain, constipation, diarrhea and nausea.   Endocrine: Negative for cold intolerance and heat intolerance.   Genitourinary:  Negative for dysuria, frequency, hematuria, pelvic pain and vaginal bleeding.   Musculoskeletal:  Negative for arthralgias, back pain, gait problem, joint swelling and myalgias.   Skin:  Negative for color change and rash.   Allergic/Immunologic: Negative for environmental allergies and food allergies.   Neurological:  Negative for dizziness, tremors, speech difficulty, weakness, numbness and headaches.   Hematological:  Does not bruise/bleed easily.   Psychiatric/Behavioral:  Negative for agitation, dysphoric mood and sleep disturbance. The patient is not nervous/anxious.         Past Medical History  She has a past medical history of Alcohol use disorder, severe, dependence (Multi) (09/13/2023), Anxiety, Arthritis, Atypical ductal hyperplasia, breast, BRCA1 negative (mom tested negative), BRCA2 negative (mom tested negative), Cellulitis, Depression, Eczema, Erythema nodosum, GERD (gastroesophageal reflux disease), Hidradenitis suppurativa (08/10/2023), Moderate cervical dysplasia, Moderate dysplasia of cervix (KERA II), Sebaceous cyst (08/10/2023), TMJ inflammation (09/13/2023), Tubal ectopic pregnancy ([facility]) (09/13/2023), and White coat syndrome with hypertension (09/13/2023).     Physical Exam  Patient is alert and  oriented x3 and in a relaxed and appropriate mood. Her gait is steady and hand grasps are equal. Sclera is clear. The breasts are nearly symmetrical. The tissue is soft without palpable abnormalities, discrete nodules or masses. The right breast has a well-healed partial circumareolar incision. The skin and nipples appear normal. There is no cervical, supraclavicular or axillary lymphadenopathy. Heart rate and rhythm normal, S1 and S2 appreciated. The lungs are clear to auscultation bilaterally. Abdomen is soft and non-tender.       Physical Exam  Chest:              Last Recorded Vitals  Vitals:    02/13/25 1507   BP: 139/87   Pulse: 79   Temp: 35.4 °C (95.8 °F)   SpO2: 100%       Relevant Results   Time was spent viewing digital images of the radiology testing with the patient, results pending    Risk Profile      Assessment/Plan   High risk surveillance care, normal clinical exam, screening mammogram, history of right ADH/FEA, family history of breast cancer, heterogeneously dense tissue     Plan: Return in one year for bilateral screening mammogram and office visit. Fast MRI in July/August 2025. Endocrine therapy discussed and recommended, declines at this time.     Patient Discussion/Summary  Your clinical examination is normal. Please return in one year for bilateral screening mammogram and office visit or sooner if you have any problems or concerns.     High risk breast surveillance care plan:  Yearly mammogram with digital breast tomosynthesis  Twice yearly clinical breast examinations  Breast MRI - Fast MRI in July/August 2025  Monthly self breast examinations  Vitamin D3 2000 IU/daily (over the counter)  Exercise 3-4 times per week for 45-60 minutes  Limit alcohol to 3-4 drinks per week   Eat a healthy low-fat diet with lots of fruits and vegetables  Risk model indicates you are eligible for endocrine therapy with Tamoxifen. Endocrine therapy reduces lifetime risk of breast cancer by 50% when taken for 5  years.    You can see your health information, review clinical summaries from office visits & test results online when you follow your health with MY  Chart, a personal health record. To sign up go to [email or web address]. If you need assistance with signing up or trouble getting into your account call [facility] Patient Line 24/7 at [phone].    My office phone number is [phone] if you need to get in touch with me or have additional questions or concerns. Thank you for choosing [facility] and trusting me as your healthcare provider. I look forward to seeing you again at your next office visit. I am honored to be a provider on your health care team and I remain dedicated to helping you achieve your health goals.      Ofelia Chavarria, APRN-CNP

## 2025-02-05 ENCOUNTER — APPOINTMENT (OUTPATIENT)
Dept: GASTROENTEROLOGY | Facility: EXTERNAL LOCATION | Age: 45
End: 2025-02-05
Payer: COMMERCIAL

## 2025-02-13 ENCOUNTER — OFFICE VISIT (OUTPATIENT)
Dept: SURGICAL ONCOLOGY | Facility: CLINIC | Age: 45
End: 2025-02-13
Payer: COMMERCIAL

## 2025-02-13 ENCOUNTER — HOSPITAL ENCOUNTER (OUTPATIENT)
Dept: RADIOLOGY | Facility: CLINIC | Age: 45
Discharge: HOME | End: 2025-02-13
Payer: COMMERCIAL

## 2025-02-13 VITALS
WEIGHT: 136.47 LBS | HEART RATE: 79 BPM | BODY MASS INDEX: 22.03 KG/M2 | DIASTOLIC BLOOD PRESSURE: 87 MMHG | SYSTOLIC BLOOD PRESSURE: 139 MMHG | TEMPERATURE: 95.8 F | OXYGEN SATURATION: 100 %

## 2025-02-13 DIAGNOSIS — R92.30 DENSE BREAST TISSUE: ICD-10-CM

## 2025-02-13 DIAGNOSIS — Z12.39 BREAST CANCER SCREENING, HIGH RISK PATIENT: Primary | ICD-10-CM

## 2025-02-13 DIAGNOSIS — Z12.31 ENCOUNTER FOR SCREENING MAMMOGRAM FOR HIGH-RISK PATIENT: ICD-10-CM

## 2025-02-13 PROCEDURE — 77063 BREAST TOMOSYNTHESIS BI: CPT

## 2025-02-13 PROCEDURE — 77067 SCR MAMMO BI INCL CAD: CPT | Mod: BILATERAL PROCEDURE | Performed by: RADIOLOGY

## 2025-02-13 PROCEDURE — 99214 OFFICE O/P EST MOD 30 MIN: CPT | Performed by: NURSE PRACTITIONER

## 2025-02-13 PROCEDURE — 77063 BREAST TOMOSYNTHESIS BI: CPT | Mod: BILATERAL PROCEDURE | Performed by: RADIOLOGY

## 2025-02-13 ASSESSMENT — PAIN SCALES - GENERAL: PAINLEVEL_OUTOF10: 0-NO PAIN

## 2025-02-13 ASSESSMENT — PATIENT HEALTH QUESTIONNAIRE - PHQ9
SUM OF ALL RESPONSES TO PHQ9 QUESTIONS 1 & 2: 0
1. LITTLE INTEREST OR PLEASURE IN DOING THINGS: NOT AT ALL
2. FEELING DOWN, DEPRESSED OR HOPELESS: NOT AT ALL

## 2025-02-13 NOTE — PATIENT INSTRUCTIONS
Your clinical examination is normal. Please return in one year for bilateral screening mammogram and office visit or sooner if you have any problems or concerns.     High risk breast surveillance care plan:  Yearly mammogram with digital breast tomosynthesis  Twice yearly clinical breast examinations  Breast MRI - Fast MRI in July/August 2025  Monthly self breast examinations  Vitamin D3 2000 IU/daily (over the counter)  Exercise 3-4 times per week for 45-60 minutes  Limit alcohol to 3-4 drinks per week   Eat a healthy low-fat diet with lots of fruits and vegetables  Risk model indicates you are eligible for endocrine therapy with Tamoxifen. Endocrine therapy reduces lifetime risk of breast cancer by 50% when taken for 5 years.    You can see your health information, review clinical summaries from office visits & test results online when you follow your health with MY  Chart, a personal health record. To sign up go to [email or web address]. If you need assistance with signing up or trouble getting into your account call [facility] Patient Line 24/7 at [phone].    My office phone number is [phone] if you need to get in touch with me or have additional questions or concerns. Thank you for choosing [facility] and trusting me as your healthcare provider. I look forward to seeing you again at your next office visit. I am honored to be a provider on your health care team and I remain dedicated to helping you achieve your health goals.

## 2025-03-03 ENCOUNTER — APPOINTMENT (OUTPATIENT)
Dept: DERMATOLOGY | Facility: HOSPITAL | Age: 45
End: 2025-03-03
Payer: COMMERCIAL

## 2025-03-05 ENCOUNTER — APPOINTMENT (OUTPATIENT)
Dept: PRIMARY CARE | Facility: CLINIC | Age: 45
End: 2025-03-05
Payer: COMMERCIAL

## 2025-03-05 DIAGNOSIS — G47.9 SLEEP DISTURBANCES: ICD-10-CM

## 2025-03-05 DIAGNOSIS — F41.9 ANXIETY: ICD-10-CM

## 2025-03-05 DIAGNOSIS — F41.8 DEPRESSION WITH ANXIETY: Primary | ICD-10-CM

## 2025-03-05 PROBLEM — H26.9 INCIPIENT CATARACT: Status: ACTIVE | Noted: 2024-11-19

## 2025-03-05 PROBLEM — H10.13 ALLERGIC CONJUNCTIVITIS OF BOTH EYES: Status: ACTIVE | Noted: 2024-11-19

## 2025-03-05 PROCEDURE — 99214 OFFICE O/P EST MOD 30 MIN: CPT | Performed by: INTERNAL MEDICINE

## 2025-03-05 RX ORDER — BUDESONIDE 90 UG/1
AEROSOL, POWDER RESPIRATORY (INHALATION)
COMMUNITY
Start: 2024-11-24

## 2025-03-05 RX ORDER — ESCITALOPRAM OXALATE 10 MG/1
10 TABLET ORAL DAILY
Qty: 30 TABLET | Refills: 1 | Status: SHIPPED | OUTPATIENT
Start: 2025-03-05

## 2025-03-05 ASSESSMENT — ENCOUNTER SYMPTOMS
SLEEP DISTURBANCE: 1
NERVOUS/ANXIOUS: 1
DYSPHORIC MOOD: 1
ACTIVITY CHANGE: 0
FATIGUE: 1
AGITATION: 1
DEPRESSION: 1
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
## 2025-03-05 NOTE — PATIENT INSTRUCTIONS
As we discussed, we will start on a low-dose of Lexapro, or escitalopram.  I have sent in a 10 mg strength, but would recommend starting with 1/2 tablet daily for the first 1 to 2 weeks.  I would recommend taking this with food, as initially this can cause mild nausea.  After 1 to 2 weeks, if you would like to consider increasing this to a full tablet strength, that would be fine to do.  Please keep us updated on how you are doing with this therapy and please reach out with any questions.  If you would prefer to have a referral to a psychiatry specialist as well, please let us know so that we can be of assistance.  Again, please keep us updated on how you are responding.

## 2025-03-24 ENCOUNTER — OFFICE VISIT (OUTPATIENT)
Dept: ORTHOPEDIC SURGERY | Facility: CLINIC | Age: 45
End: 2025-03-24
Payer: COMMERCIAL

## 2025-03-24 DIAGNOSIS — M22.2X1 PATELLOFEMORAL ARTHRALGIA OF BOTH KNEES: ICD-10-CM

## 2025-03-24 DIAGNOSIS — M22.2X2 PATELLOFEMORAL ARTHRALGIA OF BOTH KNEES: ICD-10-CM

## 2025-03-24 PROCEDURE — 99213 OFFICE O/P EST LOW 20 MIN: CPT | Performed by: STUDENT IN AN ORGANIZED HEALTH CARE EDUCATION/TRAINING PROGRAM

## 2025-03-24 NOTE — PROGRESS NOTES
Ofelia Wiggins  is a 44 y.o. year-old  female.  She reports she is overall helped with physical therapy.  When she does her exercises at home daily her pain improves.  When she misses them her pain comes back.  She would like to attempt more physical therapy but needed to see me first.      Past Medical, Family, and Social History reviewed   Review of Systems  A complete review of systems was conducted, pertinent only to the HPI noted above.  Constitutional: None  Eyes: No additions to above history  Ears, Nose, Throat: No additions to above history  Cardiovascular: No additions to above history  Respiratory: No additions to above history  GI: No additions to above history  : No additions to above history  Skin/Neuro: No additions to above history  Endocrine/Heme/Lymph: No additions to above history  Immunologic: No additions to above history  Psychiatric: No additions to above history  Musculoskeletal: see above    GEN: Alert and Oriented x 3  Constitutional: Well appearing , in no apparent distress.  Skin: No rashes, erythema, or induration around knee    MUSCULOSKELETAL EXAM:     Bilateral KNEE:  ROM: 3/0/140  Effusion: negative  Alignment: [neutral]      Gait: [normal]  Sensation intact bilaterally sural/saphenous/sp/dp/tibial nerve bilaterally  Motor 5/5 knee flexion/extension/foot DF/PF/EHL/FHL bilaterally  Palpable/symmetric DP and PT pulse bilaterally      PATELLAR/EXTENSOR MECHANISM:   KNEE:  Patellar Crepitus: mild  Patellar Compression Pain:yes  Patellar Apprehension: [no]  Extensor Mechanism: [intact]  Straight Leg Raise: good      LIGAMENTS:  ACL: Lachmans: [negative]  PCL: [stable]  Valgus at 0 degrees: [stable]  Valgus at 30 degrees: [stable]  Varus at 0 degrees: [stable]  Varus at 30 degrees: [stable]    MENISCUS EXAM:  Joint Line Tenderness:neg  McMurrays: [negative]  Pain with Deep Flexion: [No]    Xrays independently viewed and interpreted: No evidence of fracture or degenerative  changes    Discussed the possibility of injections however patient would like to hold off.  We will have her continue with physical therapy discussed the importance of continued home exercises all questions were answered she is in agreement with this plan.

## 2025-04-06 ENCOUNTER — PATIENT MESSAGE (OUTPATIENT)
Dept: DERMATOLOGY | Facility: CLINIC | Age: 45
End: 2025-04-06
Payer: COMMERCIAL

## 2025-04-06 DIAGNOSIS — L73.9 FOLLICULITIS: Primary | ICD-10-CM

## 2025-04-08 ENCOUNTER — APPOINTMENT (OUTPATIENT)
Dept: DERMATOLOGY | Facility: CLINIC | Age: 45
End: 2025-04-08
Payer: COMMERCIAL

## 2025-04-08 RX ORDER — BENZOYL PEROXIDE 50 MG/ML
LIQUID TOPICAL DAILY
Qty: 142 G | Refills: 3 | Status: SHIPPED | OUTPATIENT
Start: 2025-04-08 | End: 2026-04-08

## 2025-04-08 RX ORDER — CLINDAMYCIN PHOSPHATE 10 UG/ML
LOTION TOPICAL 2 TIMES DAILY
Qty: 60 ML | Refills: 3 | Status: SHIPPED | OUTPATIENT
Start: 2025-04-08 | End: 2026-04-08

## 2025-04-09 ENCOUNTER — APPOINTMENT (OUTPATIENT)
Dept: GASTROENTEROLOGY | Facility: EXTERNAL LOCATION | Age: 45
End: 2025-04-09
Payer: COMMERCIAL

## 2025-04-09 DIAGNOSIS — Z12.11 SCREENING FOR COLORECTAL CANCER: ICD-10-CM

## 2025-04-09 DIAGNOSIS — Z12.12 SCREENING FOR COLORECTAL CANCER: Primary | ICD-10-CM

## 2025-04-09 DIAGNOSIS — Z12.12 SCREENING FOR COLORECTAL CANCER: ICD-10-CM

## 2025-04-09 DIAGNOSIS — Z12.11 SCREENING FOR COLORECTAL CANCER: Primary | ICD-10-CM

## 2025-04-09 DIAGNOSIS — Z00.00 ROUTINE GENERAL MEDICAL EXAMINATION AT A HEALTH CARE FACILITY: ICD-10-CM

## 2025-04-09 PROCEDURE — G0121 COLON CA SCRN NOT HI RSK IND: HCPCS | Performed by: INTERNAL MEDICINE

## 2025-04-10 ENCOUNTER — APPOINTMENT (OUTPATIENT)
Dept: PRIMARY CARE | Facility: CLINIC | Age: 45
End: 2025-04-10
Payer: COMMERCIAL

## 2025-04-10 DIAGNOSIS — G47.9 SLEEP DISTURBANCES: ICD-10-CM

## 2025-04-10 DIAGNOSIS — F41.8 DEPRESSION WITH ANXIETY: ICD-10-CM

## 2025-04-10 DIAGNOSIS — F41.9 ANXIETY: ICD-10-CM

## 2025-04-10 RX ORDER — ESCITALOPRAM OXALATE 10 MG/1
10 TABLET ORAL DAILY
Qty: 90 TABLET | Refills: 2 | Status: SHIPPED | OUTPATIENT
Start: 2025-04-10

## 2025-04-15 ENCOUNTER — APPOINTMENT (OUTPATIENT)
Dept: DERMATOLOGY | Facility: CLINIC | Age: 45
End: 2025-04-15
Payer: COMMERCIAL

## 2025-05-01 ENCOUNTER — PATIENT MESSAGE (OUTPATIENT)
Dept: DERMATOLOGY | Facility: CLINIC | Age: 45
End: 2025-05-01
Payer: COMMERCIAL

## 2025-05-02 ENCOUNTER — APPOINTMENT (OUTPATIENT)
Dept: DERMATOLOGY | Facility: CLINIC | Age: 45
End: 2025-05-02
Payer: COMMERCIAL

## 2025-05-21 DIAGNOSIS — F41.9 ANXIETY: ICD-10-CM

## 2025-05-21 RX ORDER — CLONIDINE HYDROCHLORIDE 0.1 MG/1
0.1 TABLET ORAL 2 TIMES DAILY
Qty: 180 TABLET | Refills: 1 | Status: SHIPPED | OUTPATIENT
Start: 2025-05-21

## 2025-07-10 DIAGNOSIS — F41.9 ANXIETY: ICD-10-CM

## 2025-07-10 RX ORDER — CLONIDINE HYDROCHLORIDE 0.1 MG/1
0.1 TABLET ORAL 2 TIMES DAILY
Qty: 180 TABLET | Refills: 1 | Status: SHIPPED | OUTPATIENT
Start: 2025-07-10

## 2025-08-04 ENCOUNTER — APPOINTMENT (OUTPATIENT)
Dept: RADIOLOGY | Facility: CLINIC | Age: 45
End: 2025-08-04

## 2025-08-06 ENCOUNTER — APPOINTMENT (OUTPATIENT)
Dept: RADIOLOGY | Facility: HOSPITAL | Age: 45
End: 2025-08-06
Payer: COMMERCIAL

## 2025-09-23 ENCOUNTER — APPOINTMENT (OUTPATIENT)
Dept: DERMATOLOGY | Facility: CLINIC | Age: 45
End: 2025-09-23
Payer: COMMERCIAL

## 2026-01-14 ENCOUNTER — APPOINTMENT (OUTPATIENT)
Dept: PRIMARY CARE | Facility: CLINIC | Age: 46
End: 2026-01-14
Payer: COMMERCIAL